# Patient Record
Sex: FEMALE | Race: WHITE | NOT HISPANIC OR LATINO
[De-identification: names, ages, dates, MRNs, and addresses within clinical notes are randomized per-mention and may not be internally consistent; named-entity substitution may affect disease eponyms.]

---

## 2017-01-20 LAB — INR PPP: 2.2

## 2017-02-17 LAB — INR PPP: 2.64

## 2017-03-13 ENCOUNTER — MEDICATION RENEWAL (OUTPATIENT)
Age: 82
End: 2017-03-13

## 2017-03-17 LAB — INR PPP: 2.36

## 2017-04-12 LAB — INR PPP: 2.64

## 2017-05-10 LAB — INR PPP: 2.53

## 2017-06-07 LAB — INR PPP: 2.44

## 2017-07-05 LAB — INR PPP: 2.77

## 2017-08-08 LAB — INR PPP: 3.03

## 2017-08-22 LAB — INR PPP: 2.03

## 2017-09-18 ENCOUNTER — NON-APPOINTMENT (OUTPATIENT)
Age: 82
End: 2017-09-18

## 2017-09-18 ENCOUNTER — APPOINTMENT (OUTPATIENT)
Dept: CARDIOLOGY | Facility: CLINIC | Age: 82
End: 2017-09-18
Payer: MEDICARE

## 2017-09-18 VITALS
DIASTOLIC BLOOD PRESSURE: 68 MMHG | OXYGEN SATURATION: 94 % | HEIGHT: 63 IN | SYSTOLIC BLOOD PRESSURE: 120 MMHG | HEART RATE: 77 BPM | BODY MASS INDEX: 25.87 KG/M2 | WEIGHT: 146 LBS

## 2017-09-18 PROCEDURE — 99215 OFFICE O/P EST HI 40 MIN: CPT

## 2017-09-18 PROCEDURE — 93000 ELECTROCARDIOGRAM COMPLETE: CPT

## 2017-09-19 LAB — INR PPP: 2.55

## 2017-10-17 LAB — INR PPP: 2.85

## 2017-11-14 LAB — INR PPP: 2.59

## 2018-01-09 LAB — INR PPP: 4.07

## 2018-01-15 LAB — INR PPP: 2.55

## 2018-02-06 LAB — INR PPP: 2.49

## 2018-03-07 LAB — INR PPP: 2.42

## 2018-03-08 ENCOUNTER — APPOINTMENT (OUTPATIENT)
Dept: UROLOGY | Facility: CLINIC | Age: 83
End: 2018-03-08
Payer: MEDICARE

## 2018-03-08 VITALS
OXYGEN SATURATION: 97 % | SYSTOLIC BLOOD PRESSURE: 112 MMHG | HEART RATE: 77 BPM | DIASTOLIC BLOOD PRESSURE: 72 MMHG | TEMPERATURE: 97.5 F

## 2018-03-08 DIAGNOSIS — R31.0 GROSS HEMATURIA: ICD-10-CM

## 2018-03-08 PROCEDURE — 99204 OFFICE O/P NEW MOD 45 MIN: CPT

## 2018-03-09 LAB
APPEARANCE: CLEAR
BACTERIA: NEGATIVE
BILIRUBIN URINE: NEGATIVE
BLOOD URINE: NEGATIVE
COLOR: ABNORMAL
GLUCOSE QUALITATIVE U: NEGATIVE MG/DL
HYALINE CASTS: 1 /LPF
KETONES URINE: ABNORMAL
LEUKOCYTE ESTERASE URINE: NEGATIVE
MICROSCOPIC-UA: NORMAL
NITRITE URINE: NEGATIVE
PH URINE: 5.5
PROTEIN URINE: NEGATIVE MG/DL
RED BLOOD CELLS URINE: 6 /HPF
SPECIFIC GRAVITY URINE: 1.02
SQUAMOUS EPITHELIAL CELLS: 2 /HPF
UROBILINOGEN URINE: NEGATIVE MG/DL
WHITE BLOOD CELLS URINE: 3 /HPF

## 2018-03-13 LAB
BACTERIA UR CULT: NORMAL
CORE LAB FLUID CYTOLOGY: NORMAL

## 2018-04-02 LAB — INR PPP: 2.14

## 2018-04-04 ENCOUNTER — NON-APPOINTMENT (OUTPATIENT)
Age: 83
End: 2018-04-04

## 2018-04-04 ENCOUNTER — APPOINTMENT (OUTPATIENT)
Dept: CARDIOLOGY | Facility: CLINIC | Age: 83
End: 2018-04-04
Payer: MEDICARE

## 2018-04-04 VITALS
HEIGHT: 63 IN | HEART RATE: 89 BPM | SYSTOLIC BLOOD PRESSURE: 129 MMHG | BODY MASS INDEX: 26.05 KG/M2 | OXYGEN SATURATION: 96 % | DIASTOLIC BLOOD PRESSURE: 84 MMHG | WEIGHT: 147 LBS

## 2018-04-04 LAB — INR PPP: 2.45

## 2018-04-04 PROCEDURE — 93000 ELECTROCARDIOGRAM COMPLETE: CPT

## 2018-04-04 PROCEDURE — 99215 OFFICE O/P EST HI 40 MIN: CPT

## 2018-04-30 LAB — INR PPP: 1.9

## 2018-05-16 LAB — INR PPP: 2.41

## 2018-05-22 ENCOUNTER — APPOINTMENT (OUTPATIENT)
Dept: CARDIOLOGY | Facility: CLINIC | Age: 83
End: 2018-05-22
Payer: MEDICARE

## 2018-05-22 PROCEDURE — 93306 TTE W/DOPPLER COMPLETE: CPT

## 2018-06-06 LAB — INR PPP: 2.08

## 2018-07-03 LAB — INR PPP: 2.15

## 2018-07-06 ENCOUNTER — APPOINTMENT (OUTPATIENT)
Dept: INTERNAL MEDICINE | Facility: CLINIC | Age: 83
End: 2018-07-06
Payer: MEDICARE

## 2018-07-06 VITALS
DIASTOLIC BLOOD PRESSURE: 70 MMHG | WEIGHT: 144 LBS | SYSTOLIC BLOOD PRESSURE: 110 MMHG | BODY MASS INDEX: 25.52 KG/M2 | HEIGHT: 63 IN | TEMPERATURE: 97.9 F

## 2018-07-06 DIAGNOSIS — Z13.89 ENCOUNTER FOR SCREENING FOR OTHER DISORDER: ICD-10-CM

## 2018-07-06 DIAGNOSIS — Z87.891 PERSONAL HISTORY OF NICOTINE DEPENDENCE: ICD-10-CM

## 2018-07-06 DIAGNOSIS — Z80.1 FAMILY HISTORY OF MALIGNANT NEOPLASM OF TRACHEA, BRONCHUS AND LUNG: ICD-10-CM

## 2018-07-06 PROCEDURE — G0444 DEPRESSION SCREEN ANNUAL: CPT | Mod: 59

## 2018-07-06 PROCEDURE — 99214 OFFICE O/P EST MOD 30 MIN: CPT | Mod: 25

## 2018-07-06 PROCEDURE — 36415 COLL VENOUS BLD VENIPUNCTURE: CPT

## 2018-07-06 NOTE — HISTORY OF PRESENT ILLNESS
[FreeTextEntry1] : f.u thyroid, anemia, hematuria [de-identified] : She has not seen any more blood in her urine.\par She is compliant with her Coumadin. Her INR has been therapeutic. It is monitored by her cardiologist.\par No chest pain, palpitations, shortness of breath or edema \par She line Dances regularly but notes that she can't walk as much as she used to\par

## 2018-07-06 NOTE — PLAN
[FreeTextEntry1] : \par Her blood pressure is stable\par Check labs today. We'll make adjustments to her thyroid medication based on lab results\par Continue to stay active\par Followup with urology if any blood in her urine\par Depression screening negative\par Followup 3 months\par \par

## 2018-07-06 NOTE — PHYSICAL EXAM
[No Acute Distress] : no acute distress [Well Nourished] : well nourished [Well Developed] : well developed [Well-Appearing] : well-appearing [Normal Sclera/Conjunctiva] : normal sclera/conjunctiva [Normal Outer Ear/Nose] : the outer ears and nose were normal in appearance [Normal Oropharynx] : the oropharynx was normal [Supple] : supple [No Lymphadenopathy] : no lymphadenopathy [No Respiratory Distress] : no respiratory distress  [Clear to Auscultation] : lungs were clear to auscultation bilaterally [No Accessory Muscle Use] : no accessory muscle use [Normal Rate] : normal rate  [Regular Rhythm] : with a regular rhythm [Normal S1, S2] : normal S1 and S2 [No Murmur] : no murmur heard [Pedal Pulses Present] : the pedal pulses are present [No Edema] : there was no peripheral edema [Normal Posterior Cervical Nodes] : no posterior cervical lymphadenopathy [Normal Anterior Cervical Nodes] : no anterior cervical lymphadenopathy [Normal Gait] : normal gait [Normal Affect] : the affect was normal [Normal Insight/Judgement] : insight and judgment were intact

## 2018-07-12 LAB
ALBUMIN SERPL ELPH-MCNC: 4.5 G/DL
ALP BLD-CCNC: 69 U/L
ALT SERPL-CCNC: 10 U/L
ANION GAP SERPL CALC-SCNC: 14 MMOL/L
AST SERPL-CCNC: 16 U/L
BASOPHILS # BLD AUTO: 0 K/UL
BASOPHILS NFR BLD AUTO: 0 %
BILIRUB SERPL-MCNC: 0.8 MG/DL
BUN SERPL-MCNC: 15 MG/DL
CALCIUM SERPL-MCNC: 9.3 MG/DL
CHLORIDE SERPL-SCNC: 105 MMOL/L
CHOLEST SERPL-MCNC: 164 MG/DL
CHOLEST/HDLC SERPL: 2.8 RATIO
CO2 SERPL-SCNC: 24 MMOL/L
CREAT SERPL-MCNC: 0.68 MG/DL
EOSINOPHIL # BLD AUTO: 0.01 K/UL
EOSINOPHIL NFR BLD AUTO: 0.2 %
GLUCOSE SERPL-MCNC: 110 MG/DL
HBA1C MFR BLD HPLC: 5.8 %
HCT VFR BLD CALC: 38.8 %
HDLC SERPL-MCNC: 58 MG/DL
HGB BLD-MCNC: 12.7 G/DL
IMM GRANULOCYTES NFR BLD AUTO: 0.2 %
LDLC SERPL CALC-MCNC: 92 MG/DL
LYMPHOCYTES # BLD AUTO: 0.9 K/UL
LYMPHOCYTES NFR BLD AUTO: 17.6 %
MAN DIFF?: NORMAL
MCHC RBC-ENTMCNC: 29.9 PG
MCHC RBC-ENTMCNC: 32.7 GM/DL
MCV RBC AUTO: 91.3 FL
MONOCYTES # BLD AUTO: 0.43 K/UL
MONOCYTES NFR BLD AUTO: 8.4 %
NEUTROPHILS # BLD AUTO: 3.75 K/UL
NEUTROPHILS NFR BLD AUTO: 73.6 %
PLATELET # BLD AUTO: 121 K/UL
POTASSIUM SERPL-SCNC: 4.2 MMOL/L
PROT SERPL-MCNC: 6.6 G/DL
RBC # BLD: 4.25 M/UL
RBC # FLD: 13.6 %
SODIUM SERPL-SCNC: 143 MMOL/L
TRIGL SERPL-MCNC: 69 MG/DL
TSH SERPL-ACNC: 1.69 UIU/ML
WBC # FLD AUTO: 5.1 K/UL

## 2018-07-31 LAB — INR PPP: 2.2

## 2018-08-28 LAB — INR PPP: 1.83

## 2018-09-07 ENCOUNTER — RX RENEWAL (OUTPATIENT)
Age: 83
End: 2018-09-07

## 2018-09-11 LAB — INR PPP: 3.2

## 2018-09-26 LAB — INR PPP: 3.42

## 2018-10-01 ENCOUNTER — RECORD ABSTRACTING (OUTPATIENT)
Age: 83
End: 2018-10-01

## 2018-10-02 ENCOUNTER — APPOINTMENT (OUTPATIENT)
Dept: INTERNAL MEDICINE | Facility: CLINIC | Age: 83
End: 2018-10-02
Payer: MEDICARE

## 2018-10-02 VITALS
WEIGHT: 145 LBS | TEMPERATURE: 98.1 F | DIASTOLIC BLOOD PRESSURE: 70 MMHG | BODY MASS INDEX: 25.69 KG/M2 | SYSTOLIC BLOOD PRESSURE: 120 MMHG | HEIGHT: 63 IN

## 2018-10-02 DIAGNOSIS — Z23 ENCOUNTER FOR IMMUNIZATION: ICD-10-CM

## 2018-10-02 DIAGNOSIS — Z87.19 PERSONAL HISTORY OF OTHER DISEASES OF THE DIGESTIVE SYSTEM: ICD-10-CM

## 2018-10-02 PROCEDURE — 36415 COLL VENOUS BLD VENIPUNCTURE: CPT

## 2018-10-02 PROCEDURE — 99214 OFFICE O/P EST MOD 30 MIN: CPT | Mod: 25

## 2018-10-02 PROCEDURE — G0008: CPT

## 2018-10-02 PROCEDURE — 90662 IIV NO PRSV INCREASED AG IM: CPT

## 2018-10-02 RX ORDER — PANTOPRAZOLE 40 MG/1
40 TABLET, DELAYED RELEASE ORAL DAILY
Qty: 90 | Refills: 0 | Status: DISCONTINUED | COMMUNITY
Start: 2018-09-07 | End: 2018-10-02

## 2018-10-02 NOTE — PLAN
[FreeTextEntry1] : Check labs today\par Medication list reviewed patient. She is unsure why she was on Protonix. Will hold for now. If she becomes symptomatic and start ranitidine.\par Flu shot today\par Followup 3 months

## 2018-10-02 NOTE — PHYSICAL EXAM
[No Acute Distress] : no acute distress [Normal Sclera/Conjunctiva] : normal sclera/conjunctiva [Normal Outer Ear/Nose] : the outer ears and nose were normal in appearance [No Respiratory Distress] : no respiratory distress  [Clear to Auscultation] : lungs were clear to auscultation bilaterally [No Accessory Muscle Use] : no accessory muscle use [Normal Rate] : normal rate  [Normal S1, S2] : normal S1 and S2 [No Edema] : there was no peripheral edema [Normal Affect] : the affect was normal [Normal Insight/Judgement] : insight and judgment were intact [de-identified] : irregularly irregular

## 2018-10-02 NOTE — HISTORY OF PRESENT ILLNESS
[FreeTextEntry1] : f/u BP, thyroid [de-identified] : She denies complaints. No chest pain, palpitations, shortness of breath. No edema.\par She is compliant with her medications.\par She stopped iron due to constipation

## 2018-10-08 LAB
ALBUMIN SERPL ELPH-MCNC: 4.4 G/DL
ALP BLD-CCNC: 73 U/L
ALT SERPL-CCNC: 18 U/L
ANION GAP SERPL CALC-SCNC: 12 MMOL/L
AST SERPL-CCNC: 23 U/L
BASOPHILS # BLD AUTO: 0 K/UL
BASOPHILS NFR BLD AUTO: 0 %
BILIRUB SERPL-MCNC: 0.7 MG/DL
BUN SERPL-MCNC: 15 MG/DL
CALCIUM SERPL-MCNC: 9.7 MG/DL
CHLORIDE SERPL-SCNC: 106 MMOL/L
CHOLEST SERPL-MCNC: 152 MG/DL
CHOLEST/HDLC SERPL: 3 RATIO
CO2 SERPL-SCNC: 25 MMOL/L
CREAT SERPL-MCNC: 0.61 MG/DL
DIGOXIN SERPL-MCNC: 0.9 NG/ML
EOSINOPHIL # BLD AUTO: 0 K/UL
EOSINOPHIL NFR BLD AUTO: 0 %
GLUCOSE SERPL-MCNC: 99 MG/DL
HBA1C MFR BLD HPLC: 6 %
HCT VFR BLD CALC: 38.9 %
HDLC SERPL-MCNC: 50 MG/DL
HGB BLD-MCNC: 12.3 G/DL
IMM GRANULOCYTES NFR BLD AUTO: 0.2 %
LDLC SERPL CALC-MCNC: 87 MG/DL
LYMPHOCYTES # BLD AUTO: 0.88 K/UL
LYMPHOCYTES NFR BLD AUTO: 15.4 %
MAN DIFF?: NORMAL
MCHC RBC-ENTMCNC: 29.2 PG
MCHC RBC-ENTMCNC: 31.6 GM/DL
MCV RBC AUTO: 92.4 FL
MONOCYTES # BLD AUTO: 0.42 K/UL
MONOCYTES NFR BLD AUTO: 7.4 %
NEUTROPHILS # BLD AUTO: 4.4 K/UL
NEUTROPHILS NFR BLD AUTO: 77 %
PLATELET # BLD AUTO: 122 K/UL
POTASSIUM SERPL-SCNC: 4.8 MMOL/L
PROT SERPL-MCNC: 6.8 G/DL
RBC # BLD: 4.21 M/UL
RBC # FLD: 14.4 %
SODIUM SERPL-SCNC: 143 MMOL/L
TRIGL SERPL-MCNC: 77 MG/DL
WBC # FLD AUTO: 5.71 K/UL

## 2018-10-10 ENCOUNTER — APPOINTMENT (OUTPATIENT)
Dept: INTERNAL MEDICINE | Facility: CLINIC | Age: 83
End: 2018-10-10
Payer: MEDICARE

## 2018-10-10 VITALS
TEMPERATURE: 97.1 F | HEIGHT: 63 IN | BODY MASS INDEX: 25.69 KG/M2 | DIASTOLIC BLOOD PRESSURE: 72 MMHG | WEIGHT: 145 LBS | SYSTOLIC BLOOD PRESSURE: 114 MMHG

## 2018-10-10 DIAGNOSIS — R58 HEMORRHAGE, NOT ELSEWHERE CLASSIFIED: ICD-10-CM

## 2018-10-10 DIAGNOSIS — R22.9 LOCALIZED SWELLING, MASS AND LUMP, UNSPECIFIED: ICD-10-CM

## 2018-10-10 PROCEDURE — 99213 OFFICE O/P EST LOW 20 MIN: CPT

## 2018-10-10 NOTE — HISTORY OF PRESENT ILLNESS
[FreeTextEntry8] : she c/o bruise on her left arm since her appointment on 10/2/18. She had a flu shot in that arm. the arm has been painful. She has been taking advil. at the site of the flu shot it is hard and tender. she denies any trauma.

## 2018-10-10 NOTE — PHYSICAL EXAM
[No Acute Distress] : no acute distress [de-identified] : left deltoid swelling and hardness and small ecchymosist. left arm large ecchymosis above elbow

## 2018-10-10 NOTE — PLAN
[FreeTextEntry1] : She does have swelling and a small bruise at the site of the injection. However the large bruise above her elbow is at the site of the blood pressure cuff. She states it's getting a little better. She is a icing her upper arm and taking Advil as needed. I advised her that we should check her INR. She had it done today according to her normal schedule. She will get a call from her cardiologist about it

## 2018-10-11 ENCOUNTER — RESULT REVIEW (OUTPATIENT)
Age: 83
End: 2018-10-11

## 2018-10-11 LAB — INR PPP: 2.62

## 2018-11-02 ENCOUNTER — RESULT REVIEW (OUTPATIENT)
Age: 83
End: 2018-11-02

## 2018-11-04 LAB — INR PPP: 2.64

## 2018-11-05 ENCOUNTER — NON-APPOINTMENT (OUTPATIENT)
Age: 83
End: 2018-11-05

## 2018-11-05 ENCOUNTER — APPOINTMENT (OUTPATIENT)
Dept: CARDIOLOGY | Facility: CLINIC | Age: 83
End: 2018-11-05
Payer: MEDICARE

## 2018-11-05 VITALS
HEART RATE: 77 BPM | SYSTOLIC BLOOD PRESSURE: 155 MMHG | BODY MASS INDEX: 25.69 KG/M2 | DIASTOLIC BLOOD PRESSURE: 73 MMHG | OXYGEN SATURATION: 98 % | WEIGHT: 145 LBS | HEIGHT: 63 IN

## 2018-11-05 VITALS — SYSTOLIC BLOOD PRESSURE: 128 MMHG | DIASTOLIC BLOOD PRESSURE: 76 MMHG

## 2018-11-05 DIAGNOSIS — Z01.810 ENCOUNTER FOR PREPROCEDURAL CARDIOVASCULAR EXAMINATION: ICD-10-CM

## 2018-11-05 PROCEDURE — 93000 ELECTROCARDIOGRAM COMPLETE: CPT

## 2018-11-05 PROCEDURE — 99215 OFFICE O/P EST HI 40 MIN: CPT

## 2018-11-05 NOTE — HISTORY OF PRESENT ILLNESS
[FreeTextEntry1] : Mrs. Crista Combs presented to the office today for follow-up cardiac evaluation.\par \par The patient is an 87-year-old female with a history of permanent atrial fibrillation, being managed with rate-control therapy and anticoagulation. She has a history of mild aortic insufficiency, mild-moderate mitral regurgitation, mild-moderate tricuspid regurgitation, mild coronary arteriosclerosis, mild carotid atherosclerosis, pre-diabetes, hypothyroidism, osteopenia, and glaucoma.\par \par The patient has been doing well from a cardiac symptomatic standpoint since her previous visit here on 4/4/18. Specifically, she has not noted recurrence of dyspnea on exertion, noting that she has resumed\par line dancing, without any associated cardiac symptoms. She has not experienced any symptoms of chest discomfort or palpitations. She has not noted orthopnea, paroxysmal nocturnal dyspnea, or lower extremity edema. She has not experienced any episodes of presyncope or syncope.\par \par Review of systems is significant for the patient having undergone left inguinal hernia repair surgery on 9/21/16, resection of a basal cell carcinoma from the right upper aspect of the back on 11/30/16, left cataract surgery on 4/9/18 and right cataract surgery on 4/26/18.\par \par An INR measurement performed on 10/10/18 (home drawing) revealed the level to be 2.62. The patient was instructed at that time to continue Coumadin at a dosage of 1 mg 1 day per week and 2 mg 6 days per week, and to have a follow-up INR measurement performed in 3 weeks for reassessment.\par \par Echocardiography most recently performed on 5/22/18 revealed mild left atrial enlargement with normal-sized ventricles. Left ventricular systolic function wall thickness and wall motion were normal, with an estimated ejection fraction of 60%. Mild mitral regurgitation and mild tricuspid regurgitation were demonstrated, with an estimated right ventricular systolic pressure 34 mm mercury.\par \par Laboratory studies performed on 10/2/18 revealed cholesterol 152, triglycerides 77, HDL 50, and calculated LDL 87. The liver chemistries were normal. The BUN and creatinine were 15 and 0.61, respectively. The potassium was 4.8. The glucose was 99 and the hemoglobin A1c level is 6.0%. The digoxin level was 0.9. The hemoglobin and hematocrit were 12.3 and 38.9, respectively. The platelet count was 122,000.\par \par Previous History:\par \par At the time of a previous visit here on 11/23/14, the patient informed me that over the preceding few months, she had noted an increased degree of dyspnea on exertion in association with her activities, which include walking and line dancing.\par \par Nuclear exercise testing performed on 12/7/15 for further evaluation of the patient's symptom of dyspnea on exertion  revealed the patient to exhibit diminished exercise capacity secondary to developing fatigue. No symptoms of chest discomfort or dyspnea developed during the study. There was an exaggerated heart rate response to exercise, noting the baseline rhythm of atrial fibrillation. Electrocardiographic evidence for myocardial ischemia was not demonstrated, noting the presence of baseline repolarization abnormalities, as well as the patient being treated with digoxin. Numerous episodes of wide-complex tachycardia were exhibited during exercise and the recovery period, possibly representing non-sustained ventricular tachycardia vs. aberrant conduction.The cardiac imaging portion of the study revealed normal left ventricular myocardial perfusion and systolic function, with a calculated ejection fraction of 64%\par \par Coronary angiography performed on 12/21/15, for further of the patient's symptom of dyspnea on exertion and the wide-complex tachycardia episodes exhibited during the nuclear stress study, revealed mild luminal irregularities in the coronary arteries, however, no obstructive coronary artery disease was demonstrated.\par \par Blood testing performed prior to the cardiac catheterization demonstrated the presence of an anemia, for which the patient was referred back to her internist for further evaluation. She had follow-up blood testing performed through the office of Dr. Perez on 1/26/16, which revealed the hemoglobin and hematocrit to be 9.7 and 34.4, respectively, with microcytic, hypochromic indices. The iron profile revealed the serum iron to be diminished at 21, the total iron binding capacity to be elevated at 440, the iron percent saturation to be diminished at 5%, and the ferritin level to be diminished at 9.0, all compatible with iron deficiency. The patient was started on ferrous sulfate 325 mg once daily. In addition, her Synthroid dosage was reduced from 112 mcg daily to 100 mg daily, noting that the thyroid-stimulating hormone level was 0.19. The glucose level was 104 and the hemoglobin A1c level was 6.8%. The digoxin level was 0.4. The BUN and creatinine were 19 and 0.75, respectively, with an estimated glomerular filtration rate of 73. The potassium level was 4.7.The liver chemistries were normal.\par \par The patient described having had a rectal examination performed by Dr. Perez, however, she is not certain if her stool specimen was positive for evidence of occult blood.\par \par The patient was referred by Dr. Perez to a gastroenterologist, who the patient stated discussed endoscopy and colonoscopy procedures, however, did not definitively recommended that the patient have these procedures performed.\par \par Follow-up laboratory testing performed by Dr. Perez on 4/28/16 revealed hemoglobin and hematocrit to be 13.0 and 43.6, respectively. The platelet count was mildly diminished at 114,000.\par \par The patient has a prior history of 2 documented episodes of paroxysmal atrial fibrillation, with the initial episode having occurred on 12/9/03, and a subsequent episode having developed on 10/1/05. Both of these episodes reverted back to sinus rhythm spontaneously. The patient had been maintained on beta-blocker therapy over the years, however, she had previously declined anticoagulation, and had been taking aspirin as an alternative. In February of 2014, the patient was discovered as exhibiting atrial fibrillation with a tachycardic ventricular response, when she presented to the emergency room at [facility] on 2/6/14 with symptoms suggestive of gastroenteritis, and possibly diverticulitis. Her dosage of Toprol-XL was increased, and digoxin was added for additional rate-control therapy. In addition, anticoagulation with Coumadin was initiated.\par \par Holter monitoring performed from 2/25/14 through 2/26/14 revealed atrial fibrillation throughout the recording, with an average ventricular rate of 72 beats per minute area there were no sustained extremes of bradycardic or tachycardic ventricular response. Rare to occasional pauses were noted, the longest of which was measured to be 3.3 seconds in duration. Rare ventricular premature contractions were noted, including 2 couplets, however, no episodes of ventricular tachycardia occurred. No symptoms were reported during the recording.\par \par Carotid artery Doppler testing performed on 5/14/15 revealed mild plaque formation involving the proximal portions of the internal carotid arteries bilaterally, however, no significant stenoses were demonstrated.\par \par As previously stated, in February of 2014, the patient developed gastroenteritis, manifesting as diarrhea, weakness, decreased appetite, lightheadedness, and shortness of breath.  After a few days of experiencing these symptoms, she presented to the emergency room at [facility] on 2/6/14, and was discovered as exhibiting atrial fibrillation with a tachycardic ventricular response. Regarding the atrial fibrillation, she was treated with intensified rate-control therapy (her dosage of Toprol-XL was increased and she was started on digoxin), and anticoagulation was initiated to reduce the risk of stroke associated with atrial fibrillation.  Cardioversion was not attempted, noting that the duration of the atrial fibrillation was uncertain, and the patient was not anticoagulated prior to presenting to the hospital. She was managed with intravenous hydration and antibiotic therapy for her gastroenteritis (she recently completed a course of Cipro and Flagyl). She reports having had a CT scan of the abdomen performed, however, neither she nor her daughter (who is a nurse in [place], and had accompanied the patient to her previous visit here on 2/19/14) were uncertain as to whether or not this study revealed evidence for diverticulitis.\par \par As far as risk factors for coronary artery disease are concerned, the patient denies having a history of hypertension, diabetes, a dyslipidemia, or cigarette smoking. She does not have a known immediate family history of premature coronary artery disease.\par \par Past medical history is otherwise significant for possible pericarditis more than 30 years ago, glaucoma, hypothyroidism, osteopenia (or osteoporosis), right inguinal hernia repair (with mesh) on 11/3/14, and left inguinal hernia repair on 9/21/16.\par

## 2018-11-05 NOTE — PHYSICAL EXAM
[General Appearance - Well Developed] : well developed [General Appearance - In No Acute Distress] : no acute distress [Normal Conjunctiva] : the conjunctiva exhibited no abnormalities [No Oral Pallor] : no oral pallor [Respiration, Rhythm And Depth] : normal respiratory rhythm and effort [Auscultation Breath Sounds / Voice Sounds] : lungs were clear to auscultation bilaterally [Bowel Sounds] : normal bowel sounds [Abdomen Tenderness] : non-tender [Abnormal Walk] : normal gait [Cyanosis, Localized] : no localized cyanosis [] : no rash [Oriented To Time, Place, And Person] : oriented to person, place, and time [Not Palpable] : not palpable [No Precordial Heave] : no precordial heave was noted [Normal Rate] : normal [Irregularly Irregular] : irregularly irregular [Normal S1] : normal S1 [Normal S2] : normal S2 [No Gallop] : no gallop heard [No Murmur] : no murmurs heard [2+] : left 2+ [No Abnormalities] : the abdominal aorta was not enlarged and no bruit was heard [No Pitting Edema] : no pitting edema present [FreeTextEntry1] : no significant JVD is appreciated at a 45° angle [Apical Thrill] : no thrill palpable at the apex [Click] : no click [Pericardial Rub] : no pericardial rub [Right Carotid Bruit] : no bruit heard over the right carotid [Left Carotid Bruit] : no bruit heard over the left carotid

## 2018-11-05 NOTE — DISCUSSION/SUMMARY
[FreeTextEntry1] : Mrs. Combs has been doing well from a cardiac symptomatic standpoint since her previous visit here on 4/4/18. Specifically, she does not describe having experienced any signs or symptoms to suggest the development of an anginal syndrome, congestive heart failure, or a hemodynamically-compromising arrhythmia. She is exhibiting persistent asymptomatic atrial fibrillation on her electrocardiogram today, with non-specific poor R-wave progression and non-specific repolarization abnormalities, essentially unchanged from her previous office tracing. Her cardiac examination today is unchanged from her previous visit with me, and she is specifically not exhibiting any evidence of congestive heart failure on examination today. Her blood pressure reading was moderately elevated upon initial presentation to the office today, however, a follow-up measurement obtained after her examination revealed the bleeding to be normal.\par \par The patient's iron deficiency anemia corrected with iron supplementation, as advanced on the most recent blood test report of 10/2/18. Her platelet count was again mildly diminished, however, and is being followed by her internist, according to the patient.\par \par An INR measurement performed on 10/10/18 revealed the reading to be therapeutic at 2.14. The patient was instructed at that time to take Coumadin at a dosage 2 mg 6 days per week and 1 mg 1 day per week. The patient will be having a follow-up INR level obtained via a home drawing in the near future for reassessment.\par \par I have instructed the patient to continue on her present cardiac medications as prescribed for the time being. I have electronically prescribed a renewal for Coumadin to the patient's pharmacy for her today.\par \par The patient anticipates having follow-up blood testing performed through the office of her internist in the near future, and she will have a copy of the results forwarded to my office for my review and records.\par \par I have reviewed the findings of the echocardiogram of 5/22/18 in detail with the patient today.\par \par I have asked the patient to call me if she should have any questions or problems pertaining to these matters, and especially if she should experience any concerning symptoms. I have otherwise asked her to return to the office for follow-up cardiac evaluation in 6 months, provided she remains clinically stable in the interim.

## 2018-11-28 LAB
INR PPP: 2.55
PT BLD: 28.6

## 2018-12-19 ENCOUNTER — RESULT REVIEW (OUTPATIENT)
Age: 83
End: 2018-12-19

## 2018-12-19 LAB — INR PPP: 2.18

## 2019-01-08 ENCOUNTER — LABORATORY RESULT (OUTPATIENT)
Age: 84
End: 2019-01-08

## 2019-01-08 ENCOUNTER — RX RENEWAL (OUTPATIENT)
Age: 84
End: 2019-01-08

## 2019-01-08 ENCOUNTER — APPOINTMENT (OUTPATIENT)
Dept: INTERNAL MEDICINE | Facility: CLINIC | Age: 84
End: 2019-01-08
Payer: MEDICARE

## 2019-01-08 VITALS
TEMPERATURE: 97 F | SYSTOLIC BLOOD PRESSURE: 110 MMHG | WEIGHT: 146 LBS | HEIGHT: 63 IN | BODY MASS INDEX: 25.87 KG/M2 | DIASTOLIC BLOOD PRESSURE: 80 MMHG

## 2019-01-08 PROCEDURE — 36415 COLL VENOUS BLD VENIPUNCTURE: CPT

## 2019-01-08 PROCEDURE — 99214 OFFICE O/P EST MOD 30 MIN: CPT | Mod: 25

## 2019-01-08 NOTE — HEALTH RISK ASSESSMENT
[] : No [(PHQ-2) Unable to screen] : PHQ-2: unable to screen [UnableToScreenReason] : patient refused

## 2019-01-08 NOTE — PHYSICAL EXAM
[No Acute Distress] : no acute distress [Well Nourished] : well nourished [Normal Sclera/Conjunctiva] : normal sclera/conjunctiva [PERRL] : pupils equal round and reactive to light [Normal Outer Ear/Nose] : the outer ears and nose were normal in appearance [Normal Oropharynx] : the oropharynx was normal [Supple] : supple [No Respiratory Distress] : no respiratory distress  [Clear to Auscultation] : lungs were clear to auscultation bilaterally [No Accessory Muscle Use] : no accessory muscle use [Normal Rate] : normal rate  [Regular Rhythm] : with a regular rhythm [Normal S1, S2] : normal S1 and S2 [Normal Gait] : normal gait [No Focal Deficits] : no focal deficits [Normal Affect] : the affect was normal [Normal Insight/Judgement] : insight and judgment were intact

## 2019-01-08 NOTE — PLAN
[FreeTextEntry1] : refused depression screening\par BP controlled\par check labs\par refill synthoid as labs dictate\par f/u 3 months

## 2019-01-15 ENCOUNTER — RESULT REVIEW (OUTPATIENT)
Age: 84
End: 2019-01-15

## 2019-01-15 LAB
ALBUMIN SERPL ELPH-MCNC: 4.4 G/DL
ALP BLD-CCNC: 69 U/L
ALT SERPL-CCNC: 11 U/L
ANION GAP SERPL CALC-SCNC: 11 MMOL/L
AST SERPL-CCNC: 20 U/L
BILIRUB SERPL-MCNC: 0.6 MG/DL
BUN SERPL-MCNC: 17 MG/DL
CALCIUM SERPL-MCNC: 9.6 MG/DL
CHLORIDE SERPL-SCNC: 105 MMOL/L
CHOLEST SERPL-MCNC: 165 MG/DL
CHOLEST/HDLC SERPL: 3.2 RATIO
CO2 SERPL-SCNC: 26 MMOL/L
CREAT SERPL-MCNC: 0.69 MG/DL
DIGOXIN SERPL-MCNC: 0.6 NG/ML
GLUCOSE SERPL-MCNC: 92 MG/DL
HBA1C MFR BLD HPLC: 5.9 %
HDLC SERPL-MCNC: 51 MG/DL
LDLC SERPL CALC-MCNC: 96 MG/DL
POTASSIUM SERPL-SCNC: 5 MMOL/L
PROT SERPL-MCNC: 6.8 G/DL
SODIUM SERPL-SCNC: 142 MMOL/L
TRIGL SERPL-MCNC: 92 MG/DL

## 2019-01-16 LAB — INR PPP: 2.19

## 2019-02-12 LAB — INR PPP: 1.93

## 2019-03-13 LAB — INR PPP: 1.9

## 2019-03-26 ENCOUNTER — OTHER (OUTPATIENT)
Age: 84
End: 2019-03-26

## 2019-03-26 LAB — INR PPP: 2.1

## 2019-04-11 LAB — INR PPP: 2.21

## 2019-05-01 ENCOUNTER — APPOINTMENT (OUTPATIENT)
Dept: INTERNAL MEDICINE | Facility: CLINIC | Age: 84
End: 2019-05-01
Payer: MEDICARE

## 2019-05-01 ENCOUNTER — OTHER (OUTPATIENT)
Age: 84
End: 2019-05-01

## 2019-05-01 VITALS
HEIGHT: 63 IN | DIASTOLIC BLOOD PRESSURE: 80 MMHG | BODY MASS INDEX: 26.22 KG/M2 | TEMPERATURE: 97.2 F | SYSTOLIC BLOOD PRESSURE: 110 MMHG | WEIGHT: 148 LBS

## 2019-05-01 LAB — INR PPP: 3.22

## 2019-05-01 PROCEDURE — 99214 OFFICE O/P EST MOD 30 MIN: CPT | Mod: 25

## 2019-05-01 PROCEDURE — 36415 COLL VENOUS BLD VENIPUNCTURE: CPT

## 2019-05-01 NOTE — HISTORY OF PRESENT ILLNESS
[FreeTextEntry1] : f/u thyroid [de-identified] : doing well. no complaints\par had her INR checked yesterday. it was fine. no bleeding\par no chest pain, palpitations, SOB

## 2019-05-01 NOTE — PHYSICAL EXAM
[Well Nourished] : well nourished [Normal Sclera/Conjunctiva] : normal sclera/conjunctiva [No Acute Distress] : no acute distress [Supple] : supple [Normal Outer Ear/Nose] : the outer ears and nose were normal in appearance [Clear to Auscultation] : lungs were clear to auscultation bilaterally [No Respiratory Distress] : no respiratory distress  [No Accessory Muscle Use] : no accessory muscle use [Normal S1, S2] : normal S1 and S2 [Normal Rate] : normal rate  [Regular Rhythm] : with a regular rhythm [Normal Gait] : normal gait [No Focal Deficits] : no focal deficits [Normal Insight/Judgement] : insight and judgment were intact [Normal Affect] : the affect was normal

## 2019-05-01 NOTE — PLAN
[FreeTextEntry1] : declined depression screening\par BP controlled\par check labs\par refill synthroid as labs dictate\par Immunizations UTD\par f/u 4-5 months

## 2019-05-02 ENCOUNTER — RX RENEWAL (OUTPATIENT)
Age: 84
End: 2019-05-02

## 2019-05-07 LAB
ALBUMIN SERPL ELPH-MCNC: 4.4 G/DL
ALP BLD-CCNC: 70 U/L
ALT SERPL-CCNC: 14 U/L
ANION GAP SERPL CALC-SCNC: 11 MMOL/L
AST SERPL-CCNC: 18 U/L
BILIRUB SERPL-MCNC: 0.6 MG/DL
BUN SERPL-MCNC: 17 MG/DL
CALCIUM SERPL-MCNC: 9.4 MG/DL
CHLORIDE SERPL-SCNC: 107 MMOL/L
CO2 SERPL-SCNC: 25 MMOL/L
CREAT SERPL-MCNC: 0.61 MG/DL
ESTIMATED AVERAGE GLUCOSE: 126 MG/DL
GLUCOSE SERPL-MCNC: 122 MG/DL
HBA1C MFR BLD HPLC: 6 %
POTASSIUM SERPL-SCNC: 4.4 MMOL/L
PROT SERPL-MCNC: 6.3 G/DL
SODIUM SERPL-SCNC: 143 MMOL/L
TSH SERPL-ACNC: 1.97 UIU/ML

## 2019-05-15 LAB — INR PPP: 3.24

## 2019-05-29 LAB — INR PPP: 2.4

## 2019-06-14 ENCOUNTER — RESULT REVIEW (OUTPATIENT)
Age: 84
End: 2019-06-14

## 2019-06-18 ENCOUNTER — MEDICATION RENEWAL (OUTPATIENT)
Age: 84
End: 2019-06-18

## 2019-06-19 ENCOUNTER — OTHER (OUTPATIENT)
Age: 84
End: 2019-06-19

## 2019-06-19 LAB — INR PPP: 3.49

## 2019-06-25 ENCOUNTER — MESSAGE (OUTPATIENT)
Age: 84
End: 2019-06-25

## 2019-06-26 ENCOUNTER — MESSAGE (OUTPATIENT)
Age: 84
End: 2019-06-26

## 2019-07-02 ENCOUNTER — OTHER (OUTPATIENT)
Age: 84
End: 2019-07-02

## 2019-07-02 LAB — INR PPP: 2.3

## 2019-07-24 LAB — INR PPP: 2.3

## 2019-08-06 ENCOUNTER — APPOINTMENT (OUTPATIENT)
Dept: CARDIOLOGY | Facility: CLINIC | Age: 84
End: 2019-08-06
Payer: MEDICARE

## 2019-08-06 ENCOUNTER — NON-APPOINTMENT (OUTPATIENT)
Age: 84
End: 2019-08-06

## 2019-08-06 VITALS
DIASTOLIC BLOOD PRESSURE: 62 MMHG | OXYGEN SATURATION: 98 % | HEART RATE: 65 BPM | HEIGHT: 63 IN | BODY MASS INDEX: 26.05 KG/M2 | WEIGHT: 147 LBS | SYSTOLIC BLOOD PRESSURE: 105 MMHG

## 2019-08-06 DIAGNOSIS — R06.09 OTHER FORMS OF DYSPNEA: ICD-10-CM

## 2019-08-06 PROCEDURE — 99215 OFFICE O/P EST HI 40 MIN: CPT

## 2019-08-06 PROCEDURE — 93000 ELECTROCARDIOGRAM COMPLETE: CPT

## 2019-08-06 NOTE — PHYSICAL EXAM
[General Appearance - In No Acute Distress] : no acute distress [General Appearance - Well Developed] : well developed [Normal Conjunctiva] : the conjunctiva exhibited no abnormalities [No Oral Pallor] : no oral pallor [Respiration, Rhythm And Depth] : normal respiratory rhythm and effort [Auscultation Breath Sounds / Voice Sounds] : lungs were clear to auscultation bilaterally [Bowel Sounds] : normal bowel sounds [Abdomen Tenderness] : non-tender [Abnormal Walk] : normal gait [Cyanosis, Localized] : no localized cyanosis [] : no rash [Oriented To Time, Place, And Person] : oriented to person, place, and time [No Precordial Heave] : no precordial heave was noted [Not Palpable] : not palpable [Irregularly Irregular] : irregularly irregular [Normal Rate] : normal [Normal S1] : normal S1 [Normal S2] : normal S2 [No Gallop] : no gallop heard [No Murmur] : no murmurs heard [2+] : left 2+ [No Abnormalities] : the abdominal aorta was not enlarged and no bruit was heard [No Pitting Edema] : no pitting edema present [FreeTextEntry1] : no significant JVD is appreciated at a 45° angle [Click] : no click [Apical Thrill] : no thrill palpable at the apex [Pericardial Rub] : no pericardial rub [Right Carotid Bruit] : no bruit heard over the right carotid [Left Carotid Bruit] : no bruit heard over the left carotid

## 2019-08-06 NOTE — DISCUSSION/SUMMARY
Patient came into [facility] office today requesting for lab result, validated proper identification and password along with date of birth, received copy of negative gc/chlamydia/ trich/yeast/ bv. DOREEN Murillo RN     Signatures   Electronically signed by : Koffi Murillo R.N.; Sep  6 2018 10:01AM CST    
[FreeTextEntry1] : Mrs. Combs has been doing well from a cardiac symptomatic standpoint since her previous visit here on 11/5/18. Specifically, she does not describe having experienced any signs or symptoms to suggest the development of an anginal syndrome, congestive heart failure, or a hemodynamically-compromising arrhythmia. She is exhibiting persistent asymptomatic atrial fibrillation on her electrocardiogram today, with non-specific poor R-wave progression and non-specific repolarization abnormalities, essentially unchanged from her previous office tracing. Her cardiac examination today is unchanged from her previous visit with me, and she is specifically not exhibiting any evidence of congestive heart failure on examination today. Her blood pressure reading today is normal.\par \par The patient's iron deficiency anemia corrected with iron supplementation, as evidenced on the blood test report of 10/2/18. Her platelet count was again mildly diminished, however, and is being followed by her internist, according to the patient.\par \par An INR measurement performed on 7/24/19 revealed the reading to be therapeutic at 2.3. The patient was instructed at that time to take Coumadin at a dosage 2 mg 6 days per week and 1 mg 1 day per week. The patient was instructed at that time to have a follow-up INR measurement performed in 4 weeks for reassessment.\par \par I have instructed the patient to continue her present cardiac medications as presently prescribed for the time being.\par \par The patient anticipates having follow-up blood testing performed through the office of her internist in the near future, and she will have a copy of the results forwarded to my office for my review and records.\par \par I am referring the patient for follow-up echocardiography at this point to reassess cardiac structural integrity, left ventricular function, and valvular morphology and function. The patient is going to make arrangements to have this study performed through our office, and I will telephone her to discuss the findings, once the study has been completed.\par \par I have asked the patient to call me if she should have any questions or problems pertaining to these matters, and especially if she should experience any concerning symptoms. I have otherwise asked her to return to the office for follow-up cardiac evaluation in 6 months, provided she remains clinically stable in the interim.

## 2019-08-06 NOTE — HISTORY OF PRESENT ILLNESS
[FreeTextEntry1] : Mrs. Crista Combs presented to the office today for follow-up cardiac evaluation. I last evaluated the patient in the office on 11/5/18.\par \par The patient is an 88-year-old female with a history of permanent atrial fibrillation, being managed with rate-control therapy and anticoagulation. She has a history of mild aortic insufficiency, mild-moderate mitral regurgitation, mild-moderate tricuspid regurgitation, mild coronary arteriosclerosis, mild carotid atherosclerosis, pre-diabetes, hypothyroidism, osteopenia, and glaucoma.\par \par The patient has been doing well from a cardiac symptomatic standpoint since her previous visit here on 11/5/18. Specifically, she has not noted recurrence of dyspnea on exertion, noting that she has resumed\par line dancing, without any associated cardiac symptoms. She has not experienced any symptoms of chest discomfort or palpitations. She has not noted orthopnea, paroxysmal nocturnal dyspnea, or lower extremity edema. She has not experienced any episodes of presyncope or syncope.\par \par Review of systems is significant for the patient having undergone left inguinal hernia repair surgery on 9/21/16, resection of a basal cell carcinoma from the right upper aspect of the back on 11/30/16, left cataract surgery on 4/9/18 and right cataract surgery on 4/26/18. She has been discovered as having pancreatic cyst, which are under surveillance.\par \par An INR measurement performed on 7/24/19 (home drawing) revealed the level to be 2.3. The patient was instructed at that time to continue Coumadin at a dosage of 1 mg 1 day per week and 2 mg 6 days per week, and to have a follow-up INR measurement performed in 4 weeks for reassessment.\par \par Echocardiography most recently performed on 5/22/18 revealed mild left atrial enlargement with normal-sized ventricles. Left ventricular systolic function wall thickness and wall motion were normal, with an estimated ejection fraction of 60%. Mild mitral regurgitation and mild tricuspid regurgitation were demonstrated, with an estimated right ventricular systolic pressure 34 mm mercury.\par \par A fasting lipid profile performed on 10/2/18 revealed cholesterol 152, triglycerides 77, HDL 50, and calculated LDL 87. Laboratory studies more recently performed on 5/1/19 revealed the liver chemistries to be normal. The BUN and creatinine were 17 and 0.61, respectively. The potassium level is 4.4. The TSH level was 1.97. The glucose level is 122 and the hemoglobin A1c level is 6.0%.\par \par Previous History:\par \par At the time of a previous visit here on 11/23/14, the patient informed me that over the preceding few months, she had noted an increased degree of dyspnea on exertion in association with her activities, which include walking and line dancing.\par \par Nuclear exercise testing performed on 12/7/15 for further evaluation of the patient's symptom of dyspnea on exertion  revealed the patient to exhibit diminished exercise capacity secondary to developing fatigue. No symptoms of chest discomfort or dyspnea developed during the study. There was an exaggerated heart rate response to exercise, noting the baseline rhythm of atrial fibrillation. Electrocardiographic evidence for myocardial ischemia was not demonstrated, noting the presence of baseline repolarization abnormalities, as well as the patient being treated with digoxin. Numerous episodes of wide-complex tachycardia were exhibited during exercise and the recovery period, possibly representing non-sustained ventricular tachycardia vs. aberrant conduction.The cardiac imaging portion of the study revealed normal left ventricular myocardial perfusion and systolic function, with a calculated ejection fraction of 64%\par \par Coronary angiography performed on 12/21/15, for further of the patient's symptom of dyspnea on exertion and the wide-complex tachycardia episodes exhibited during the nuclear stress study, revealed mild luminal irregularities in the coronary arteries, however, no obstructive coronary artery disease was demonstrated.\par \par Blood testing performed prior to the cardiac catheterization demonstrated the presence of an anemia, for which the patient was referred back to her internist for further evaluation. She had follow-up blood testing performed through the office of Dr. Perez on 1/26/16, which revealed the hemoglobin and hematocrit to be 9.7 and 34.4, respectively, with microcytic, hypochromic indices. The iron profile revealed the serum iron to be diminished at 21, the total iron binding capacity to be elevated at 440, the iron percent saturation to be diminished at 5%, and the ferritin level to be diminished at 9.0, all compatible with iron deficiency. The patient was started on ferrous sulfate 325 mg once daily. In addition, her Synthroid dosage was reduced from 112 mcg daily to 100 mg daily, noting that the thyroid-stimulating hormone level was 0.19. The glucose level was 104 and the hemoglobin A1c level was 6.8%. The digoxin level was 0.4. The BUN and creatinine were 19 and 0.75, respectively, with an estimated glomerular filtration rate of 73. The potassium level was 4.7.The liver chemistries were normal.\par \par The patient described having had a rectal examination performed by Dr. Perez, however, she is not certain if her stool specimen was positive for evidence of occult blood.\par \par The patient was referred by Dr. Perez to a gastroenterologist, who the patient stated discussed endoscopy and colonoscopy procedures, however, did not definitively recommended that the patient have these procedures performed.\par \par Follow-up laboratory testing performed by Dr. Perez on 4/28/16 revealed hemoglobin and hematocrit to be 13.0 and 43.6, respectively. The platelet count was mildly diminished at 114,000.\par \par The patient has a prior history of 2 documented episodes of paroxysmal atrial fibrillation, with the initial episode having occurred on 12/9/03, and a subsequent episode having developed on 10/1/05. Both of these episodes reverted back to sinus rhythm spontaneously. The patient had been maintained on beta-blocker therapy over the years, however, she had previously declined anticoagulation, and had been taking aspirin as an alternative. In February of 2014, the patient was discovered as exhibiting atrial fibrillation with a tachycardic ventricular response, when she presented to the emergency room at [facility] on 2/6/14 with symptoms suggestive of gastroenteritis, and possibly diverticulitis. Her dosage of Toprol-XL was increased, and digoxin was added for additional rate-control therapy. In addition, anticoagulation with Coumadin was initiated.\par \par Holter monitoring performed from 2/25/14 through 2/26/14 revealed atrial fibrillation throughout the recording, with an average ventricular rate of 72 beats per minute area there were no sustained extremes of bradycardic or tachycardic ventricular response. Rare to occasional pauses were noted, the longest of which was measured to be 3.3 seconds in duration. Rare ventricular premature contractions were noted, including 2 couplets, however, no episodes of ventricular tachycardia occurred. No symptoms were reported during the recording.\par \par Carotid artery Doppler testing performed on 5/14/15 revealed mild plaque formation involving the proximal portions of the internal carotid arteries bilaterally, however, no significant stenoses were demonstrated.\par \par As previously stated, in February of 2014, the patient developed gastroenteritis, manifesting as diarrhea, weakness, decreased appetite, lightheadedness, and shortness of breath.  After a few days of experiencing these symptoms, she presented to the emergency room at [facility] on 2/6/14, and was discovered as exhibiting atrial fibrillation with a tachycardic ventricular response. Regarding the atrial fibrillation, she was treated with intensified rate-control therapy (her dosage of Toprol-XL was increased and she was started on digoxin), and anticoagulation was initiated to reduce the risk of stroke associated with atrial fibrillation.  Cardioversion was not attempted, noting that the duration of the atrial fibrillation was uncertain, and the patient was not anticoagulated prior to presenting to the hospital. She was managed with intravenous hydration and antibiotic therapy for her gastroenteritis (she recently completed a course of Cipro and Flagyl). She reports having had a CT scan of the abdomen performed, however, neither she nor her daughter (who is a nurse in [place], and had accompanied the patient to her previous visit here on 2/19/14) were uncertain as to whether or not this study revealed evidence for diverticulitis.\par \par As far as risk factors for coronary artery disease are concerned, the patient denies having a history of hypertension, diabetes, a dyslipidemia, or cigarette smoking. She does not have a known immediate family history of premature coronary artery disease.\par \par Past medical history is otherwise significant for possible pericarditis more than 30 years ago, glaucoma, hypothyroidism, osteopenia (or osteoporosis), right inguinal hernia repair (with mesh) on 11/3/14, and left inguinal hernia repair on 9/21/16.\par

## 2019-08-20 LAB — INR PPP: 2.82

## 2019-08-21 ENCOUNTER — APPOINTMENT (OUTPATIENT)
Dept: CARDIOLOGY | Facility: CLINIC | Age: 84
End: 2019-08-21
Payer: MEDICARE

## 2019-08-21 PROCEDURE — 93306 TTE W/DOPPLER COMPLETE: CPT

## 2019-09-18 ENCOUNTER — OTHER (OUTPATIENT)
Age: 84
End: 2019-09-18

## 2019-09-18 LAB — INR PPP: 2.75

## 2019-10-04 ENCOUNTER — APPOINTMENT (OUTPATIENT)
Dept: INTERNAL MEDICINE | Facility: CLINIC | Age: 84
End: 2019-10-04
Payer: MEDICARE

## 2019-10-04 VITALS
BODY MASS INDEX: 25.69 KG/M2 | DIASTOLIC BLOOD PRESSURE: 80 MMHG | TEMPERATURE: 97.1 F | HEART RATE: 66 BPM | WEIGHT: 145 LBS | OXYGEN SATURATION: 98 % | SYSTOLIC BLOOD PRESSURE: 120 MMHG | HEIGHT: 63 IN

## 2019-10-04 DIAGNOSIS — T80.89XA OTHER COMPLICATIONS FOLLOWING INFUSION, TRANSFUSION AND THERAPEUTIC INJECTION, INITIAL ENCOUNTER: ICD-10-CM

## 2019-10-04 PROCEDURE — 36415 COLL VENOUS BLD VENIPUNCTURE: CPT

## 2019-10-04 PROCEDURE — 99214 OFFICE O/P EST MOD 30 MIN: CPT | Mod: 25

## 2019-10-04 NOTE — PLAN
[FreeTextEntry1] : Her blood pressure is controlled\par Check labs today, will include a lipid panel for her cardiologist.\par Given a prescription for a mammogram\par She is to get the flu shot at her [facility]\par Followup 3 months

## 2019-10-04 NOTE — HISTORY OF PRESENT ILLNESS
[FreeTextEntry1] : f/u thyroid [de-identified] : She had a MRI done which showed a pancreatic cyst. She is to repeat imaging in 2 years.\par She saw her cardiologist and everything was stable.\par She takes her thyroid medication. No fatigue, change in bowel movements, change in hair or skin\par She is taking exercise classes at [facility]

## 2019-10-08 ENCOUNTER — RESULT REVIEW (OUTPATIENT)
Age: 84
End: 2019-10-08

## 2019-10-08 LAB
ALBUMIN SERPL ELPH-MCNC: 4.4 G/DL
ALP BLD-CCNC: 71 U/L
ALT SERPL-CCNC: 12 U/L
ANION GAP SERPL CALC-SCNC: 10 MMOL/L
AST SERPL-CCNC: 16 U/L
BASOPHILS # BLD AUTO: 0 K/UL
BASOPHILS NFR BLD AUTO: 0 %
BILIRUB SERPL-MCNC: 0.6 MG/DL
BUN SERPL-MCNC: 17 MG/DL
CALCIUM SERPL-MCNC: 9.5 MG/DL
CHLORIDE SERPL-SCNC: 106 MMOL/L
CHOLEST SERPL-MCNC: 145 MG/DL
CHOLEST/HDLC SERPL: 2.7 RATIO
CO2 SERPL-SCNC: 25 MMOL/L
CREAT SERPL-MCNC: 0.65 MG/DL
DIGOXIN SERPL-MCNC: 0.6 NG/ML
EOSINOPHIL # BLD AUTO: 0.11 K/UL
EOSINOPHIL NFR BLD AUTO: 2 %
ESTIMATED AVERAGE GLUCOSE: 126 MG/DL
GLUCOSE SERPL-MCNC: 118 MG/DL
HBA1C MFR BLD HPLC: 6 %
HCT VFR BLD CALC: 39 %
HDLC SERPL-MCNC: 53 MG/DL
HGB BLD-MCNC: 11.7 G/DL
IMM GRANULOCYTES NFR BLD AUTO: 0.2 %
LDLC SERPL CALC-MCNC: 78 MG/DL
LYMPHOCYTES # BLD AUTO: 0.95 K/UL
LYMPHOCYTES NFR BLD AUTO: 17.4 %
MAN DIFF?: NORMAL
MCHC RBC-ENTMCNC: 27.2 PG
MCHC RBC-ENTMCNC: 30 GM/DL
MCV RBC AUTO: 90.7 FL
MONOCYTES # BLD AUTO: 0.47 K/UL
MONOCYTES NFR BLD AUTO: 8.6 %
NEUTROPHILS # BLD AUTO: 3.91 K/UL
NEUTROPHILS NFR BLD AUTO: 71.8 %
PLATELET # BLD AUTO: 107 K/UL
POTASSIUM SERPL-SCNC: 4.8 MMOL/L
PROT SERPL-MCNC: 6.2 G/DL
RBC # BLD: 4.3 M/UL
RBC # FLD: 15.4 %
SODIUM SERPL-SCNC: 141 MMOL/L
TRIGL SERPL-MCNC: 71 MG/DL
TSH SERPL-ACNC: 1.73 UIU/ML
WBC # FLD AUTO: 5.45 K/UL

## 2019-10-09 ENCOUNTER — RESULT REVIEW (OUTPATIENT)
Age: 84
End: 2019-10-09

## 2019-10-16 ENCOUNTER — OTHER (OUTPATIENT)
Age: 84
End: 2019-10-16

## 2019-10-16 LAB — INR PPP: 1.9

## 2019-11-12 LAB — INR PPP: 1.79

## 2019-11-15 ENCOUNTER — TRANSCRIPTION ENCOUNTER (OUTPATIENT)
Age: 84
End: 2019-11-15

## 2019-11-21 ENCOUNTER — RX RENEWAL (OUTPATIENT)
Age: 84
End: 2019-11-21

## 2019-11-25 ENCOUNTER — CHART COPY (OUTPATIENT)
Age: 84
End: 2019-11-25

## 2019-11-27 LAB — INR PPP: 1.76

## 2019-12-13 LAB — INR PPP: 2.92

## 2019-12-19 ENCOUNTER — MEDICATION RENEWAL (OUTPATIENT)
Age: 84
End: 2019-12-19

## 2019-12-20 ENCOUNTER — MEDICATION RENEWAL (OUTPATIENT)
Age: 84
End: 2019-12-20

## 2019-12-20 ENCOUNTER — RX RENEWAL (OUTPATIENT)
Age: 84
End: 2019-12-20

## 2019-12-28 LAB — INR PPP: 1.9

## 2020-01-07 LAB — INR PPP: 2.8

## 2020-01-14 ENCOUNTER — APPOINTMENT (OUTPATIENT)
Dept: INTERNAL MEDICINE | Facility: CLINIC | Age: 85
End: 2020-01-14
Payer: MEDICARE

## 2020-01-14 VITALS
OXYGEN SATURATION: 92 % | HEIGHT: 63 IN | HEART RATE: 70 BPM | TEMPERATURE: 97 F | DIASTOLIC BLOOD PRESSURE: 78 MMHG | BODY MASS INDEX: 26.58 KG/M2 | WEIGHT: 150 LBS | SYSTOLIC BLOOD PRESSURE: 120 MMHG

## 2020-01-14 PROCEDURE — 99214 OFFICE O/P EST MOD 30 MIN: CPT | Mod: 25

## 2020-01-14 PROCEDURE — 36415 COLL VENOUS BLD VENIPUNCTURE: CPT

## 2020-01-14 NOTE — HISTORY OF PRESENT ILLNESS
[FreeTextEntry1] : f/u platelets, thyroid [de-identified] : 88-year-old female with history of atrial fibrillation and hypothyroidism. She is on Coumadin. Recently her INR has been going up and down. She didn't realize that the green vegetables affect her INR. She is reluctant to use any of the newer medications she has been on Coumadin without any problems of time.\par She denies any dizziness or palpitations. No headaches or chest pain

## 2020-01-14 NOTE — PLAN
[FreeTextEntry1] : She declines depression screening\par Blood pressure is controlled\par Check labs today including lipids and thyroid\par Continue Coumadin\par recheck platelets\par f/u 3 months

## 2020-01-16 ENCOUNTER — TRANSCRIPTION ENCOUNTER (OUTPATIENT)
Age: 85
End: 2020-01-16

## 2020-01-21 LAB
ALBUMIN SERPL ELPH-MCNC: 4.5 G/DL
ALP BLD-CCNC: 63 U/L
ALT SERPL-CCNC: 9 U/L
ANION GAP SERPL CALC-SCNC: 11 MMOL/L
AST SERPL-CCNC: 15 U/L
BASOPHILS # BLD AUTO: 0 K/UL
BASOPHILS NFR BLD AUTO: 0 %
BILIRUB SERPL-MCNC: 0.5 MG/DL
BUN SERPL-MCNC: 15 MG/DL
CALCIUM SERPL-MCNC: 9.4 MG/DL
CHLORIDE SERPL-SCNC: 105 MMOL/L
CHOLEST SERPL-MCNC: 168 MG/DL
CHOLEST/HDLC SERPL: 3.2 RATIO
CO2 SERPL-SCNC: 25 MMOL/L
CREAT SERPL-MCNC: 0.73 MG/DL
EOSINOPHIL # BLD AUTO: 0 K/UL
EOSINOPHIL NFR BLD AUTO: 0 %
GLUCOSE SERPL-MCNC: 108 MG/DL
HCT VFR BLD CALC: 39 %
HDLC SERPL-MCNC: 52 MG/DL
HGB BLD-MCNC: 11.6 G/DL
IMM GRANULOCYTES NFR BLD AUTO: 0.2 %
LDLC SERPL CALC-MCNC: 97 MG/DL
LYMPHOCYTES # BLD AUTO: 1.07 K/UL
LYMPHOCYTES NFR BLD AUTO: 21.9 %
MAN DIFF?: NORMAL
MCHC RBC-ENTMCNC: 26.9 PG
MCHC RBC-ENTMCNC: 29.7 GM/DL
MCV RBC AUTO: 90.5 FL
MONOCYTES # BLD AUTO: 0.38 K/UL
MONOCYTES NFR BLD AUTO: 7.8 %
NEUTROPHILS # BLD AUTO: 3.42 K/UL
NEUTROPHILS NFR BLD AUTO: 70.1 %
PLATELET # BLD AUTO: 118 K/UL
POTASSIUM SERPL-SCNC: 4.5 MMOL/L
PROT SERPL-MCNC: 6.5 G/DL
RBC # BLD: 4.31 M/UL
RBC # FLD: 14.1 %
SODIUM SERPL-SCNC: 141 MMOL/L
TRIGL SERPL-MCNC: 95 MG/DL
TSH SERPL-ACNC: 4.05 UIU/ML
WBC # FLD AUTO: 4.88 K/UL

## 2020-01-22 ENCOUNTER — RESULT REVIEW (OUTPATIENT)
Age: 85
End: 2020-01-22

## 2020-01-22 LAB — INR PPP: 2.25

## 2020-02-04 LAB — INR PPP: 3

## 2020-03-04 LAB — INR PPP: 2.23

## 2020-04-01 LAB — INR PPP: 2.98

## 2020-04-14 ENCOUNTER — APPOINTMENT (OUTPATIENT)
Dept: INTERNAL MEDICINE | Facility: CLINIC | Age: 85
End: 2020-04-14

## 2020-04-29 LAB — INR PPP: 3.99

## 2020-05-06 LAB — INR PPP: 2.64

## 2020-05-12 LAB — INR PPP: 4

## 2020-05-19 LAB — INR PPP: 4.06

## 2020-05-27 LAB — INR PPP: 2.6

## 2020-06-04 LAB — INR PPP: 3.09

## 2020-06-17 LAB — INR PPP: 2.62

## 2020-06-22 ENCOUNTER — APPOINTMENT (OUTPATIENT)
Dept: CARDIOLOGY | Facility: CLINIC | Age: 85
End: 2020-06-22
Payer: MEDICARE

## 2020-06-22 ENCOUNTER — NON-APPOINTMENT (OUTPATIENT)
Age: 85
End: 2020-06-22

## 2020-06-22 VITALS
DIASTOLIC BLOOD PRESSURE: 71 MMHG | SYSTOLIC BLOOD PRESSURE: 125 MMHG | HEART RATE: 75 BPM | WEIGHT: 148 LBS | OXYGEN SATURATION: 97 % | HEIGHT: 63 IN | BODY MASS INDEX: 26.22 KG/M2

## 2020-06-22 PROCEDURE — 93000 ELECTROCARDIOGRAM COMPLETE: CPT

## 2020-06-22 PROCEDURE — 99215 OFFICE O/P EST HI 40 MIN: CPT

## 2020-07-08 DIAGNOSIS — Z51.81 ENCOUNTER FOR THERAPEUTIC DRUG LVL MONITORING: ICD-10-CM

## 2020-07-08 DIAGNOSIS — Z79.01 ENCOUNTER FOR THERAPEUTIC DRUG LVL MONITORING: ICD-10-CM

## 2020-07-08 LAB — INR PPP: 2.19

## 2020-07-12 ENCOUNTER — RX RENEWAL (OUTPATIENT)
Age: 85
End: 2020-07-12

## 2020-08-05 LAB — INR PPP: 2.15

## 2020-09-02 LAB — INR PPP: 2.84

## 2020-09-10 ENCOUNTER — APPOINTMENT (OUTPATIENT)
Dept: INTERNAL MEDICINE | Facility: CLINIC | Age: 85
End: 2020-09-10
Payer: MEDICARE

## 2020-09-10 VITALS
TEMPERATURE: 97.4 F | SYSTOLIC BLOOD PRESSURE: 122 MMHG | BODY MASS INDEX: 25.87 KG/M2 | DIASTOLIC BLOOD PRESSURE: 70 MMHG | WEIGHT: 146 LBS | HEIGHT: 63 IN

## 2020-09-10 VITALS — SYSTOLIC BLOOD PRESSURE: 112 MMHG | DIASTOLIC BLOOD PRESSURE: 64 MMHG

## 2020-09-10 PROCEDURE — 36415 COLL VENOUS BLD VENIPUNCTURE: CPT

## 2020-09-10 PROCEDURE — 99214 OFFICE O/P EST MOD 30 MIN: CPT | Mod: 25

## 2020-09-10 NOTE — HEALTH RISK ASSESSMENT
[] : No [(PHQ-2) Unable to screen] : PHQ-2: unable to screen [UnableToScreenReason] : patient declines

## 2020-09-10 NOTE — HISTORY OF PRESENT ILLNESS
[FreeTextEntry1] : f/u thyroid and a. fib [de-identified] : 88-year-old female with history of atrial fibrillation and hypothyroidism. She is on Coumadin. No bleeding\par She denies any dizziness or palpitations. No headaches or chest pain\par Flu shot done in the pharmacy

## 2020-09-11 LAB
ALBUMIN SERPL ELPH-MCNC: 4.4 G/DL
ALP BLD-CCNC: 64 U/L
ALT SERPL-CCNC: 9 U/L
ANION GAP SERPL CALC-SCNC: 11 MMOL/L
AST SERPL-CCNC: 19 U/L
BASOPHILS # BLD AUTO: 0 K/UL
BASOPHILS NFR BLD AUTO: 0 %
BILIRUB SERPL-MCNC: 0.5 MG/DL
BUN SERPL-MCNC: 16 MG/DL
CALCIUM SERPL-MCNC: 9 MG/DL
CHLORIDE SERPL-SCNC: 108 MMOL/L
CHOLEST SERPL-MCNC: 129 MG/DL
CHOLEST/HDLC SERPL: 2.8 RATIO
CO2 SERPL-SCNC: 23 MMOL/L
CREAT SERPL-MCNC: 0.53 MG/DL
DIGOXIN SERPL-MCNC: 0.8 NG/ML
EOSINOPHIL # BLD AUTO: 0 K/UL
EOSINOPHIL NFR BLD AUTO: 0 %
ESTIMATED AVERAGE GLUCOSE: 131 MG/DL
GLUCOSE SERPL-MCNC: 106 MG/DL
HBA1C MFR BLD HPLC: 6.2 %
HCT VFR BLD CALC: 31.7 %
HDLC SERPL-MCNC: 47 MG/DL
HGB BLD-MCNC: 8.7 G/DL
IMM GRANULOCYTES NFR BLD AUTO: 0.2 %
LDLC SERPL CALC-MCNC: 67 MG/DL
LYMPHOCYTES # BLD AUTO: 1.17 K/UL
LYMPHOCYTES NFR BLD AUTO: 23.8 %
MAN DIFF?: NORMAL
MCHC RBC-ENTMCNC: 20.8 PG
MCHC RBC-ENTMCNC: 27.4 GM/DL
MCV RBC AUTO: 75.8 FL
MONOCYTES # BLD AUTO: 0.46 K/UL
MONOCYTES NFR BLD AUTO: 9.4 %
NEUTROPHILS # BLD AUTO: 3.27 K/UL
NEUTROPHILS NFR BLD AUTO: 66.6 %
PLATELET # BLD AUTO: 135 K/UL
POTASSIUM SERPL-SCNC: 4.3 MMOL/L
PROT SERPL-MCNC: 6.4 G/DL
RBC # BLD: 4.18 M/UL
RBC # FLD: 18.3 %
SODIUM SERPL-SCNC: 142 MMOL/L
TRIGL SERPL-MCNC: 77 MG/DL
TSH SERPL-ACNC: 2.96 UIU/ML
WBC # FLD AUTO: 4.91 K/UL

## 2020-09-17 ENCOUNTER — APPOINTMENT (OUTPATIENT)
Dept: INTERNAL MEDICINE | Facility: CLINIC | Age: 85
End: 2020-09-17
Payer: MEDICARE

## 2020-09-17 VITALS
HEIGHT: 63 IN | SYSTOLIC BLOOD PRESSURE: 110 MMHG | DIASTOLIC BLOOD PRESSURE: 70 MMHG | WEIGHT: 146 LBS | TEMPERATURE: 98 F | BODY MASS INDEX: 25.87 KG/M2

## 2020-09-17 VITALS — SYSTOLIC BLOOD PRESSURE: 100 MMHG | DIASTOLIC BLOOD PRESSURE: 70 MMHG

## 2020-09-17 DIAGNOSIS — Z79.01 LONG TERM (CURRENT) USE OF ANTICOAGULANTS: ICD-10-CM

## 2020-09-17 PROCEDURE — 36415 COLL VENOUS BLD VENIPUNCTURE: CPT

## 2020-09-17 PROCEDURE — 99213 OFFICE O/P EST LOW 20 MIN: CPT | Mod: 25

## 2020-09-17 NOTE — HISTORY OF PRESENT ILLNESS
[FreeTextEntry1] : Anemia [de-identified] : She is here today to repeat her CBC.  Her labs last week showed a hemoglobin of 8.7.  She denies any bleeding.  She has not noted any dark stool.  She denies lightheadedness, chest pain, palpitations and headache. She is on coumadin.

## 2020-09-17 NOTE — PLAN
[FreeTextEntry1] : Recheck CBC today\par will call tomorrow with results. She should go to the ER if she notes any bleeding

## 2020-09-18 LAB
BASOPHILS # BLD AUTO: 0 K/UL
BASOPHILS NFR BLD AUTO: 0 %
EOSINOPHIL # BLD AUTO: 0.15 K/UL
EOSINOPHIL NFR BLD AUTO: 2.7 %
HCT VFR BLD CALC: 31.7 %
HGB BLD-MCNC: 8.6 G/DL
IMM GRANULOCYTES NFR BLD AUTO: 0.4 %
LYMPHOCYTES # BLD AUTO: 1.02 K/UL
LYMPHOCYTES NFR BLD AUTO: 18.2 %
MAN DIFF?: NORMAL
MCHC RBC-ENTMCNC: 21.3 PG
MCHC RBC-ENTMCNC: 27.1 GM/DL
MCV RBC AUTO: 78.5 FL
MONOCYTES # BLD AUTO: 0.42 K/UL
MONOCYTES NFR BLD AUTO: 7.5 %
NEUTROPHILS # BLD AUTO: 3.99 K/UL
NEUTROPHILS NFR BLD AUTO: 71.2 %
PLATELET # BLD AUTO: 112 K/UL
RBC # BLD: 4.04 M/UL
RBC # FLD: 18.9 %
WBC # FLD AUTO: 5.6 K/UL

## 2020-09-29 LAB — INR PPP: 2.12

## 2020-10-28 LAB — INR PPP: 2

## 2020-11-18 LAB — INR PPP: 1.87

## 2020-12-04 LAB — INR PPP: 2.05

## 2020-12-11 ENCOUNTER — APPOINTMENT (OUTPATIENT)
Dept: INTERNAL MEDICINE | Facility: CLINIC | Age: 85
End: 2020-12-11
Payer: MEDICARE

## 2020-12-11 ENCOUNTER — LABORATORY RESULT (OUTPATIENT)
Age: 85
End: 2020-12-11

## 2020-12-11 VITALS
BODY MASS INDEX: 25.34 KG/M2 | WEIGHT: 143 LBS | SYSTOLIC BLOOD PRESSURE: 130 MMHG | HEIGHT: 63 IN | DIASTOLIC BLOOD PRESSURE: 70 MMHG | TEMPERATURE: 98.2 F

## 2020-12-11 PROCEDURE — 99214 OFFICE O/P EST MOD 30 MIN: CPT | Mod: 25

## 2020-12-11 PROCEDURE — 36415 COLL VENOUS BLD VENIPUNCTURE: CPT

## 2020-12-11 NOTE — PLAN
[FreeTextEntry1] : BP is stable\par Continue metoprolol, digoxin and coumadin for A. fib\par check CBC, CMP, Lipids, TSH, iron\par If Hb is not improved she should see GI\par f/u 3 months

## 2020-12-11 NOTE — HISTORY OF PRESENT ILLNESS
[FreeTextEntry1] : f/u anemia, thyroid, lipids [de-identified] : 89-year-old female with history of atrial fibrillation on Coumadin and hypothyroidism who is here for follow-up.  Last visit she was found to be anemic with a hemoglobin of 8.7.  It was repeated a week later and hemoglobin was 8.6.  She was advised to see GI but she did not.  She denied bleeding at the time.  She started eating more iron rich foods and taking an OTC iron supplement. But unable to eat green leafy vegetables due to coumadin\par On coumadin. no bleeding noted\par No chest pain, palpitations or shortness of breath\par No change in weight, temperature intolerance, change in bowel movements

## 2020-12-17 LAB
ALBUMIN SERPL ELPH-MCNC: 4.7 G/DL
ALP BLD-CCNC: 67 U/L
ALT SERPL-CCNC: 11 U/L
ANION GAP SERPL CALC-SCNC: 16 MMOL/L
AST SERPL-CCNC: 15 U/L
BASOPHILS # BLD AUTO: 0.01 K/UL
BASOPHILS NFR BLD AUTO: 0.2 %
BILIRUB SERPL-MCNC: 0.4 MG/DL
BUN SERPL-MCNC: 16 MG/DL
CALCIUM SERPL-MCNC: 9.5 MG/DL
CHLORIDE SERPL-SCNC: 106 MMOL/L
CHOLEST SERPL-MCNC: 197 MG/DL
CO2 SERPL-SCNC: 23 MMOL/L
CREAT SERPL-MCNC: 0.75 MG/DL
EOSINOPHIL # BLD AUTO: 0 K/UL
EOSINOPHIL NFR BLD AUTO: 0 %
ESTIMATED AVERAGE GLUCOSE: 126 MG/DL
FERRITIN SERPL-MCNC: 50 NG/ML
GLUCOSE SERPL-MCNC: 119 MG/DL
HBA1C MFR BLD HPLC: 6 %
HCT VFR BLD CALC: 46 %
HDLC SERPL-MCNC: 61 MG/DL
HGB BLD-MCNC: 13.7 G/DL
IMM GRANULOCYTES NFR BLD AUTO: 0.2 %
IRON SATN MFR SERPL: 83 %
IRON SERPL-MCNC: 317 UG/DL
LDLC SERPL CALC-MCNC: 114 MG/DL
LYMPHOCYTES # BLD AUTO: 1.2 K/UL
LYMPHOCYTES NFR BLD AUTO: 18.1 %
MAN DIFF?: NORMAL
MCHC RBC-ENTMCNC: 27.1 PG
MCHC RBC-ENTMCNC: 29.8 GM/DL
MCV RBC AUTO: 91.1 FL
MONOCYTES # BLD AUTO: 0.51 K/UL
MONOCYTES NFR BLD AUTO: 7.7 %
NEUTROPHILS # BLD AUTO: 4.89 K/UL
NEUTROPHILS NFR BLD AUTO: 73.8 %
NONHDLC SERPL-MCNC: 136 MG/DL
PLATELET # BLD AUTO: 116 K/UL
POTASSIUM SERPL-SCNC: 4.2 MMOL/L
PROT SERPL-MCNC: 6.8 G/DL
RBC # BLD: 5.05 M/UL
RBC # FLD: 21.2 %
SODIUM SERPL-SCNC: 145 MMOL/L
TIBC SERPL-MCNC: 381 UG/DL
TRIGL SERPL-MCNC: 111 MG/DL
TSH SERPL-ACNC: 10.4 UIU/ML
UIBC SERPL-MCNC: 64 UG/DL
WBC # FLD AUTO: 6.62 K/UL

## 2020-12-23 LAB — INR PPP: 2.24

## 2021-01-05 LAB — INR PPP: 2.68

## 2021-01-21 ENCOUNTER — NON-APPOINTMENT (OUTPATIENT)
Age: 86
End: 2021-01-21

## 2021-01-21 ENCOUNTER — APPOINTMENT (OUTPATIENT)
Dept: CARDIOLOGY | Facility: CLINIC | Age: 86
End: 2021-01-21
Payer: MEDICARE

## 2021-01-21 VITALS
HEIGHT: 63 IN | WEIGHT: 147 LBS | BODY MASS INDEX: 26.05 KG/M2 | OXYGEN SATURATION: 97 % | DIASTOLIC BLOOD PRESSURE: 73 MMHG | HEART RATE: 68 BPM | SYSTOLIC BLOOD PRESSURE: 131 MMHG

## 2021-01-21 PROCEDURE — 99215 OFFICE O/P EST HI 40 MIN: CPT

## 2021-01-21 PROCEDURE — 93000 ELECTROCARDIOGRAM COMPLETE: CPT

## 2021-02-05 LAB — INR PPP: 3.21

## 2021-02-10 ENCOUNTER — APPOINTMENT (OUTPATIENT)
Dept: CARDIOLOGY | Facility: CLINIC | Age: 86
End: 2021-02-10
Payer: MEDICARE

## 2021-02-10 PROCEDURE — 93880 EXTRACRANIAL BILAT STUDY: CPT

## 2021-02-10 PROCEDURE — 93306 TTE W/DOPPLER COMPLETE: CPT

## 2021-02-19 LAB — INR PPP: 2.24

## 2021-03-12 ENCOUNTER — LABORATORY RESULT (OUTPATIENT)
Age: 86
End: 2021-03-12

## 2021-03-12 ENCOUNTER — APPOINTMENT (OUTPATIENT)
Dept: INTERNAL MEDICINE | Facility: CLINIC | Age: 86
End: 2021-03-12
Payer: MEDICARE

## 2021-03-12 VITALS
BODY MASS INDEX: 25.69 KG/M2 | SYSTOLIC BLOOD PRESSURE: 120 MMHG | WEIGHT: 145 LBS | DIASTOLIC BLOOD PRESSURE: 80 MMHG | HEIGHT: 63 IN | TEMPERATURE: 97.1 F

## 2021-03-12 DIAGNOSIS — Z12.31 ENCOUNTER FOR SCREENING MAMMOGRAM FOR MALIGNANT NEOPLASM OF BREAST: ICD-10-CM

## 2021-03-12 DIAGNOSIS — D50.9 IRON DEFICIENCY ANEMIA, UNSPECIFIED: ICD-10-CM

## 2021-03-12 PROCEDURE — 99214 OFFICE O/P EST MOD 30 MIN: CPT | Mod: 25

## 2021-03-12 PROCEDURE — 36415 COLL VENOUS BLD VENIPUNCTURE: CPT

## 2021-03-12 NOTE — HISTORY OF PRESENT ILLNESS
[FreeTextEntry1] : f/u anemia, thyroid, lipids [de-identified] : 89-year-old female with history of atrial fibrillation on Coumadin and hypothyroidism who is here for follow-up. lat visit her TSH was elevated. She was taking the synthroid and iron at the same time. She is now taking synthroid first thing in the morning by itself.\par Saw cardiology in january. Everything was ok\par On coumadin. no bleeding noted\par No chest pain, palpitations or shortness of breath\par No change in weight, temperature intolerance, change in bowel movements\par

## 2021-03-12 NOTE — PLAN
[FreeTextEntry1] : BP is stable\par Continue metoprolol, digoxin and coumadin for A. fib\par check CBC, CMP, Lipids, TSH\par Refill synthroid as labs dictate\par f/u 3 months

## 2021-03-12 NOTE — PHYSICAL EXAM
[Well Nourished] : well nourished [Normal Sclera/Conjunctiva] : normal sclera/conjunctiva [Normal Outer Ear/Nose] : the outer ears and nose were normal in appearance [No Respiratory Distress] : no respiratory distress  [No Accessory Muscle Use] : no accessory muscle use [Clear to Auscultation] : lungs were clear to auscultation bilaterally [Normal Rate] : normal rate  [Regular Rhythm] : with a regular rhythm [Normal S1, S2] : normal S1 and S2 [No Edema] : there was no peripheral edema [Coordination Grossly Intact] : coordination grossly intact [Normal Gait] : normal gait [Normal Affect] : the affect was normal [Normal Insight/Judgement] : insight and judgment were intact [No Acute Distress] : no acute distress

## 2021-03-15 LAB
ALBUMIN SERPL ELPH-MCNC: 4.5 G/DL
ALP BLD-CCNC: 68 U/L
ALT SERPL-CCNC: 13 U/L
ANION GAP SERPL CALC-SCNC: 9 MMOL/L
AST SERPL-CCNC: 15 U/L
BASOPHILS # BLD AUTO: 0 K/UL
BASOPHILS NFR BLD AUTO: 0 %
BILIRUB SERPL-MCNC: 0.8 MG/DL
BUN SERPL-MCNC: 17 MG/DL
CALCIUM SERPL-MCNC: 9.7 MG/DL
CHLORIDE SERPL-SCNC: 107 MMOL/L
CHOLEST SERPL-MCNC: 157 MG/DL
CO2 SERPL-SCNC: 27 MMOL/L
CREAT SERPL-MCNC: 0.57 MG/DL
EOSINOPHIL # BLD AUTO: 0 K/UL
EOSINOPHIL NFR BLD AUTO: 0 %
ESTIMATED AVERAGE GLUCOSE: 120 MG/DL
GLUCOSE SERPL-MCNC: 121 MG/DL
HBA1C MFR BLD HPLC: 5.8 %
HCT VFR BLD CALC: 42.1 %
HDLC SERPL-MCNC: 49 MG/DL
HGB BLD-MCNC: 13.5 G/DL
IMM GRANULOCYTES NFR BLD AUTO: 0.3 %
LDLC SERPL CALC-MCNC: 91 MG/DL
LYMPHOCYTES # BLD AUTO: 1.06 K/UL
LYMPHOCYTES NFR BLD AUTO: 14.8 %
MAN DIFF?: NORMAL
MCHC RBC-ENTMCNC: 30.1 PG
MCHC RBC-ENTMCNC: 32.1 GM/DL
MCV RBC AUTO: 94 FL
MONOCYTES # BLD AUTO: 0.52 K/UL
MONOCYTES NFR BLD AUTO: 7.3 %
NEUTROPHILS # BLD AUTO: 5.54 K/UL
NEUTROPHILS NFR BLD AUTO: 77.6 %
NONHDLC SERPL-MCNC: 108 MG/DL
PLATELET # BLD AUTO: 111 K/UL
POTASSIUM SERPL-SCNC: 4.4 MMOL/L
PROT SERPL-MCNC: 6.7 G/DL
RBC # BLD: 4.48 M/UL
RBC # FLD: 13 %
SODIUM SERPL-SCNC: 143 MMOL/L
TRIGL SERPL-MCNC: 83 MG/DL
TSH SERPL-ACNC: 0.12 UIU/ML
WBC # FLD AUTO: 7.14 K/UL

## 2021-03-18 LAB — INR PPP: 3.1

## 2021-04-16 LAB — INR PPP: 2.03

## 2021-05-13 LAB — INR PPP: 1.82

## 2021-06-02 LAB — INR PPP: 1.84

## 2021-06-03 ENCOUNTER — LABORATORY RESULT (OUTPATIENT)
Age: 86
End: 2021-06-03

## 2021-06-03 ENCOUNTER — APPOINTMENT (OUTPATIENT)
Dept: INTERNAL MEDICINE | Facility: CLINIC | Age: 86
End: 2021-06-03
Payer: MEDICARE

## 2021-06-03 VITALS
OXYGEN SATURATION: 97 % | HEIGHT: 60 IN | TEMPERATURE: 96.3 F | BODY MASS INDEX: 29.45 KG/M2 | HEART RATE: 60 BPM | WEIGHT: 150 LBS | DIASTOLIC BLOOD PRESSURE: 80 MMHG | SYSTOLIC BLOOD PRESSURE: 110 MMHG

## 2021-06-03 DIAGNOSIS — M85.80 OTHER SPECIFIED DISORDERS OF BONE DENSITY AND STRUCTURE, UNSPECIFIED SITE: ICD-10-CM

## 2021-06-03 DIAGNOSIS — K86.2 CYST OF PANCREAS: ICD-10-CM

## 2021-06-03 DIAGNOSIS — Z00.00 ENCOUNTER FOR GENERAL ADULT MEDICAL EXAMINATION W/OUT ABNORMAL FINDINGS: ICD-10-CM

## 2021-06-03 PROCEDURE — 36415 COLL VENOUS BLD VENIPUNCTURE: CPT

## 2021-06-03 PROCEDURE — G0439: CPT

## 2021-06-03 NOTE — HEALTH RISK ASSESSMENT
[] : No [No] : No [No falls in past year] : Patient reported no falls in the past year [0] : 2) Feeling down, depressed, or hopeless: Not at all (0) [[id number]] : 0 [Alone] : lives alone [] :  [# Of Children ___] : has [unfilled] children [Fully functional (bathing, dressing, toileting, transferring, walking, feeding)] : Fully functional (bathing, dressing, toileting, transferring, walking, feeding) [Fully functional (using the telephone, shopping, preparing meals, housekeeping, doing laundry, using] : Fully functional and needs no help or supervision to perform IADLs (using the telephone, shopping, preparing meals, housekeeping, doing laundry, using transportation, managing medications and managing finances) [Reports changes in hearing] : Reports no changes in hearing [Reports changes in vision] : Reports no changes in vision [Reports changes in dental health] : Reports no changes in dental health [With Patient/Caregiver] : With Patient/Caregiver [Designated Healthcare Proxy] : Designated healthcare proxy [Name: ___] : Health Care Proxy's Name: [unfilled]  [Relationship: ___] : Relationship: [unfilled] [DNR] : DNR [DNI] : DNI [AdvancecareDate] : 06/21

## 2021-06-03 NOTE — PHYSICAL EXAM
[No Acute Distress] : no acute distress [Well Nourished] : well nourished [Normal Sclera/Conjunctiva] : normal sclera/conjunctiva [Normal Outer Ear/Nose] : the outer ears and nose were normal in appearance [No Respiratory Distress] : no respiratory distress  [No Accessory Muscle Use] : no accessory muscle use [Clear to Auscultation] : lungs were clear to auscultation bilaterally [Normal Rate] : normal rate  [Regular Rhythm] : with a regular rhythm [Normal S1, S2] : normal S1 and S2 [No Edema] : there was no peripheral edema [Coordination Grossly Intact] : coordination grossly intact [Normal Gait] : normal gait [Normal Affect] : the affect was normal [Alert and Oriented x3] : oriented to person, place, and time [Normal Insight/Judgement] : insight and judgment were intact

## 2021-06-03 NOTE — HISTORY OF PRESENT ILLNESS
[de-identified] : 89-year-old female with history of atrial fibrillation on Coumadin and hypothyroidism who is here for follow-up. She started taking synthroid by itself and her TSH was low. Her dose of Synthroid was reduced to 75 mcg. She feels fine on this dose. no palpitations, jitteriness, increased fatigue, change in weight or BM\par Saw cardiology in january. Everything was ok. her last INR was a little subtherapeutic. Her dose of coumadin was increased. no bleeding noted\par No chest pain, palpitations or shortness of breath\par She had both doses of the Pfizer COVID vaccine\par She had an MRI of her abdomen done 2 years ago which revealed pancreatic cyst.  The radiologist recommended repeat MRI in 2 years. [FreeTextEntry1] : f/u anemia, thyroid, lipids

## 2021-06-03 NOTE — HISTORY OF PRESENT ILLNESS
[de-identified] : 89-year-old female with history of atrial fibrillation on Coumadin and hypothyroidism who is here for follow-up. She started taking synthroid by itself and her TSH was low. Her dose of Synthroid was reduced to 75 mcg. She feels fine on this dose. no palpitations, jitteriness, increased fatigue, change in weight or BM\par Saw cardiology in january. Everything was ok. her last INR was a little subtherapeutic. Her dose of coumadin was increased. no bleeding noted\par No chest pain, palpitations or shortness of breath\par She had both doses of the Pfizer COVID vaccine\par She had an MRI of her abdomen done 2 years ago which revealed pancreatic cyst.  The radiologist recommended repeat MRI in 2 years. [FreeTextEntry1] : f/u anemia, thyroid, lipids

## 2021-06-03 NOTE — HEALTH RISK ASSESSMENT
[] : No [No] : No [No falls in past year] : Patient reported no falls in the past year [0] : 2) Feeling down, depressed, or hopeless: Not at all (0) [[id number]] : 0 [Alone] : lives alone [] :  [# Of Children ___] : has [unfilled] children [Fully functional (bathing, dressing, toileting, transferring, walking, feeding)] : Fully functional (bathing, dressing, toileting, transferring, walking, feeding) [Fully functional (using the telephone, shopping, preparing meals, housekeeping, doing laundry, using] : Fully functional and needs no help or supervision to perform IADLs (using the telephone, shopping, preparing meals, housekeeping, doing laundry, using transportation, managing medications and managing finances) [Reports changes in hearing] : Reports no changes in hearing [Reports changes in vision] : Reports no changes in vision [Reports changes in dental health] : Reports no changes in dental health [With Patient/Caregiver] : With Patient/Caregiver [Designated Healthcare Proxy] : Designated healthcare proxy [Name: ___] : Health Care Proxy's Name: [unfilled]  [Relationship: ___] : Relationship: [unfilled] [DNR] : DNR [DNI] : DNI [AdvancecareDate] : 06/21

## 2021-06-03 NOTE — PLAN
[FreeTextEntry1] : BP is stable\par Continue metoprolol, digoxin and coumadin for A. fib\par check CBC, CMP, Lipids, TSH\par Refill synthroid as labs dictate\par She is declining to repeat the MRI of her abdomen.  She states that she is 89 years old and has a good quality of life and would not intervene on anything that is found.\par f/u 3 months

## 2021-06-07 LAB
ALBUMIN SERPL ELPH-MCNC: 4.6 G/DL
ALP BLD-CCNC: 74 U/L
ALT SERPL-CCNC: 13 U/L
ANION GAP SERPL CALC-SCNC: 12 MMOL/L
AST SERPL-CCNC: 18 U/L
BASOPHILS # BLD AUTO: 0.01 K/UL
BASOPHILS NFR BLD AUTO: 0.1 %
BILIRUB SERPL-MCNC: 0.8 MG/DL
BUN SERPL-MCNC: 17 MG/DL
CALCIUM SERPL-MCNC: 9.9 MG/DL
CHLORIDE SERPL-SCNC: 106 MMOL/L
CHOLEST SERPL-MCNC: 169 MG/DL
CO2 SERPL-SCNC: 25 MMOL/L
CREAT SERPL-MCNC: 0.66 MG/DL
EOSINOPHIL # BLD AUTO: 0 K/UL
EOSINOPHIL NFR BLD AUTO: 0 %
ESTIMATED AVERAGE GLUCOSE: 123 MG/DL
GLUCOSE SERPL-MCNC: 113 MG/DL
HBA1C MFR BLD HPLC: 5.9 %
HCT VFR BLD CALC: 43.6 %
HDLC SERPL-MCNC: 55 MG/DL
HGB BLD-MCNC: 13.8 G/DL
IMM GRANULOCYTES NFR BLD AUTO: 0.4 %
LDLC SERPL CALC-MCNC: 98 MG/DL
LYMPHOCYTES # BLD AUTO: 1.29 K/UL
LYMPHOCYTES NFR BLD AUTO: 17.7 %
MAN DIFF?: NORMAL
MCHC RBC-ENTMCNC: 30.3 PG
MCHC RBC-ENTMCNC: 31.7 GM/DL
MCV RBC AUTO: 95.8 FL
MONOCYTES # BLD AUTO: 0.53 K/UL
MONOCYTES NFR BLD AUTO: 7.3 %
NEUTROPHILS # BLD AUTO: 5.43 K/UL
NEUTROPHILS NFR BLD AUTO: 74.5 %
NONHDLC SERPL-MCNC: 114 MG/DL
PLATELET # BLD AUTO: 111 K/UL
POTASSIUM SERPL-SCNC: 4.9 MMOL/L
PROT SERPL-MCNC: 6.9 G/DL
RBC # BLD: 4.55 M/UL
RBC # FLD: 13.4 %
SODIUM SERPL-SCNC: 144 MMOL/L
TRIGL SERPL-MCNC: 81 MG/DL
TSH SERPL-ACNC: 2.56 UIU/ML
WBC # FLD AUTO: 7.29 K/UL

## 2021-06-11 ENCOUNTER — NON-APPOINTMENT (OUTPATIENT)
Age: 86
End: 2021-06-11

## 2021-06-18 LAB — INR PPP: 2.12

## 2021-07-09 LAB — INR PPP: 1.8

## 2021-07-16 LAB — INR PPP: 1.83

## 2021-07-28 LAB — INR PPP: 3.57

## 2021-08-06 LAB — INR PPP: 2.67

## 2021-08-20 LAB — INR PPP: 2.6

## 2021-09-01 ENCOUNTER — RX RENEWAL (OUTPATIENT)
Age: 86
End: 2021-09-01

## 2021-09-08 LAB — INR PPP: 2.75

## 2021-09-16 ENCOUNTER — APPOINTMENT (OUTPATIENT)
Dept: INTERNAL MEDICINE | Facility: CLINIC | Age: 86
End: 2021-09-16
Payer: MEDICARE

## 2021-09-16 ENCOUNTER — LABORATORY RESULT (OUTPATIENT)
Age: 86
End: 2021-09-16

## 2021-09-16 VITALS
DIASTOLIC BLOOD PRESSURE: 70 MMHG | HEIGHT: 63 IN | BODY MASS INDEX: 26.4 KG/M2 | TEMPERATURE: 97.9 F | WEIGHT: 149 LBS | SYSTOLIC BLOOD PRESSURE: 110 MMHG

## 2021-09-16 PROCEDURE — 99214 OFFICE O/P EST MOD 30 MIN: CPT | Mod: 25

## 2021-09-16 PROCEDURE — 36415 COLL VENOUS BLD VENIPUNCTURE: CPT

## 2021-09-16 NOTE — HISTORY OF PRESENT ILLNESS
[de-identified] : 89-year-old female with history of atrial fibrillation on Coumadin and hypothyroidism who is here for follow-up. She started taking synthroid by itself and her TSH was low. Her dose of Synthroid was reduced to 75 mcg. She feels fine on this dose. no palpitations, jitteriness, increased fatigue, change in weight or BM\par She is back to checking her INR monthly. She eats broccoli and was not being consistent with it and needed more frequent INR checks. no bleeding\par No chest pain, palpitations or shortness of breath\par She has started line dancing and will be restarting you also\par She had both doses of the Pfizer COVID vaccine\par

## 2021-09-16 NOTE — HEALTH RISK ASSESSMENT
[] : No [0] : 2) Feeling down, depressed, or hopeless: Not at all (0) [PHQ-2 Negative - No further assessment needed] : PHQ-2 Negative - No further assessment needed [[id number]] : 0

## 2021-09-16 NOTE — PHYSICAL EXAM
[No Acute Distress] : no acute distress [Well Nourished] : well nourished [Well Developed] : well developed [Normal Sclera/Conjunctiva] : normal sclera/conjunctiva [Normal Outer Ear/Nose] : the outer ears and nose were normal in appearance [No Respiratory Distress] : no respiratory distress  [No Accessory Muscle Use] : no accessory muscle use [Clear to Auscultation] : lungs were clear to auscultation bilaterally [Normal Rate] : normal rate  [Regular Rhythm] : with a regular rhythm [Normal S1, S2] : normal S1 and S2 [No Edema] : there was no peripheral edema [Coordination Grossly Intact] : coordination grossly intact [Normal Gait] : normal gait [Normal Affect] : the affect was normal [Alert and Oriented x3] : oriented to person, place, and time [Normal Insight/Judgement] : insight and judgment were intact

## 2021-09-17 ENCOUNTER — NON-APPOINTMENT (OUTPATIENT)
Age: 86
End: 2021-09-17

## 2021-09-17 LAB
ALBUMIN SERPL ELPH-MCNC: 4.4 G/DL
ALP BLD-CCNC: 73 U/L
ALT SERPL-CCNC: 10 U/L
ANION GAP SERPL CALC-SCNC: 14 MMOL/L
AST SERPL-CCNC: 15 U/L
BASOPHILS # BLD AUTO: 0 K/UL
BASOPHILS NFR BLD AUTO: 0 %
BILIRUB SERPL-MCNC: 0.6 MG/DL
BUN SERPL-MCNC: 11 MG/DL
CALCIUM SERPL-MCNC: 9.3 MG/DL
CHLORIDE SERPL-SCNC: 107 MMOL/L
CO2 SERPL-SCNC: 22 MMOL/L
CREAT SERPL-MCNC: 0.61 MG/DL
EOSINOPHIL # BLD AUTO: 0 K/UL
EOSINOPHIL NFR BLD AUTO: 0 %
ESTIMATED AVERAGE GLUCOSE: 120 MG/DL
GLUCOSE SERPL-MCNC: 112 MG/DL
HBA1C MFR BLD HPLC: 5.8 %
HCT VFR BLD CALC: 40.5 %
HGB BLD-MCNC: 12.6 G/DL
IMM GRANULOCYTES NFR BLD AUTO: 0.2 %
LYMPHOCYTES # BLD AUTO: 0.99 K/UL
LYMPHOCYTES NFR BLD AUTO: 17.3 %
MAN DIFF?: NORMAL
MCHC RBC-ENTMCNC: 29.9 PG
MCHC RBC-ENTMCNC: 31.1 GM/DL
MCV RBC AUTO: 96.2 FL
MONOCYTES # BLD AUTO: 0.48 K/UL
MONOCYTES NFR BLD AUTO: 8.4 %
NEUTROPHILS # BLD AUTO: 4.25 K/UL
NEUTROPHILS NFR BLD AUTO: 74.1 %
PLATELET # BLD AUTO: 87 K/UL
POTASSIUM SERPL-SCNC: 4.5 MMOL/L
PROT SERPL-MCNC: 6.3 G/DL
RBC # BLD: 4.21 M/UL
RBC # FLD: 13.2 %
SODIUM SERPL-SCNC: 143 MMOL/L
TSH SERPL-ACNC: 5.02 UIU/ML
WBC # FLD AUTO: 5.73 K/UL

## 2021-09-17 RX ORDER — LEVOTHYROXINE SODIUM 0.07 MG/1
75 TABLET ORAL
Qty: 90 | Refills: 1 | Status: DISCONTINUED | COMMUNITY
Start: 2021-03-15 | End: 2021-09-17

## 2021-09-24 ENCOUNTER — APPOINTMENT (OUTPATIENT)
Dept: INTERNAL MEDICINE | Facility: CLINIC | Age: 86
End: 2021-09-24
Payer: MEDICARE

## 2021-09-24 VITALS
DIASTOLIC BLOOD PRESSURE: 68 MMHG | SYSTOLIC BLOOD PRESSURE: 112 MMHG | HEIGHT: 63 IN | BODY MASS INDEX: 26.58 KG/M2 | WEIGHT: 150 LBS | TEMPERATURE: 97.7 F

## 2021-09-24 PROCEDURE — 99212 OFFICE O/P EST SF 10 MIN: CPT | Mod: 25

## 2021-09-24 PROCEDURE — 36415 COLL VENOUS BLD VENIPUNCTURE: CPT

## 2021-09-24 NOTE — HISTORY OF PRESENT ILLNESS
[FreeTextEntry1] : repeat labs [de-identified] : She is here to repeat the CBC. her platelets were 87. She is on coumadin. no bleeding or clotting\par She also requests to check her lipids. She is not fasting

## 2021-09-27 LAB
BASOPHILS # BLD AUTO: 0.01 K/UL
BASOPHILS NFR BLD AUTO: 0.1 %
CHOLEST SERPL-MCNC: 158 MG/DL
EOSINOPHIL # BLD AUTO: 0 K/UL
EOSINOPHIL NFR BLD AUTO: 0 %
HCT VFR BLD CALC: 41.9 %
HDLC SERPL-MCNC: 48 MG/DL
HGB BLD-MCNC: 12.8 G/DL
IMM GRANULOCYTES NFR BLD AUTO: 0.4 %
LDLC SERPL CALC-MCNC: 89 MG/DL
LYMPHOCYTES # BLD AUTO: 1.16 K/UL
LYMPHOCYTES NFR BLD AUTO: 16.7 %
MAN DIFF?: NORMAL
MCHC RBC-ENTMCNC: 30.3 PG
MCHC RBC-ENTMCNC: 30.5 GM/DL
MCV RBC AUTO: 99.3 FL
MONOCYTES # BLD AUTO: 0.5 K/UL
MONOCYTES NFR BLD AUTO: 7.2 %
NEUTROPHILS # BLD AUTO: 5.25 K/UL
NEUTROPHILS NFR BLD AUTO: 75.6 %
NONHDLC SERPL-MCNC: 110 MG/DL
PLATELET # BLD AUTO: 126 K/UL
RBC # BLD: 4.22 M/UL
RBC # FLD: 13.5 %
TRIGL SERPL-MCNC: 107 MG/DL
WBC # FLD AUTO: 6.95 K/UL

## 2021-10-06 LAB — INR PPP: 2.86

## 2021-11-03 LAB — INR PPP: 2.62

## 2021-12-08 LAB — INR PPP: 3.67

## 2021-12-14 ENCOUNTER — NON-APPOINTMENT (OUTPATIENT)
Age: 86
End: 2021-12-14

## 2021-12-14 ENCOUNTER — APPOINTMENT (OUTPATIENT)
Dept: CARDIOLOGY | Facility: CLINIC | Age: 86
End: 2021-12-14
Payer: MEDICARE

## 2021-12-14 VITALS
OXYGEN SATURATION: 97 % | BODY MASS INDEX: 25.93 KG/M2 | SYSTOLIC BLOOD PRESSURE: 117 MMHG | DIASTOLIC BLOOD PRESSURE: 71 MMHG | HEIGHT: 63 IN | WEIGHT: 146.31 LBS | HEART RATE: 78 BPM

## 2021-12-14 VITALS — HEART RATE: 78 BPM | HEIGHT: 63 IN | OXYGEN SATURATION: 97 %

## 2021-12-14 PROCEDURE — 93000 ELECTROCARDIOGRAM COMPLETE: CPT

## 2021-12-14 PROCEDURE — 85610 PROTHROMBIN TIME: CPT | Mod: QW

## 2021-12-14 PROCEDURE — 99215 OFFICE O/P EST HI 40 MIN: CPT

## 2021-12-16 ENCOUNTER — APPOINTMENT (OUTPATIENT)
Dept: INTERNAL MEDICINE | Facility: CLINIC | Age: 86
End: 2021-12-16
Payer: MEDICARE

## 2021-12-16 VITALS
BODY MASS INDEX: 26.05 KG/M2 | SYSTOLIC BLOOD PRESSURE: 128 MMHG | OXYGEN SATURATION: 98 % | WEIGHT: 147 LBS | HEIGHT: 63 IN | TEMPERATURE: 97.7 F | DIASTOLIC BLOOD PRESSURE: 72 MMHG | HEART RATE: 115 BPM

## 2021-12-16 DIAGNOSIS — D64.9 ANEMIA, UNSPECIFIED: ICD-10-CM

## 2021-12-16 LAB
INR PPP: 3.1 RATIO
QUALITY CONTROL: YES

## 2021-12-16 PROCEDURE — 99214 OFFICE O/P EST MOD 30 MIN: CPT | Mod: 25

## 2021-12-16 PROCEDURE — 36415 COLL VENOUS BLD VENIPUNCTURE: CPT

## 2021-12-16 NOTE — HEALTH RISK ASSESSMENT
[0] : 2) Feeling down, depressed, or hopeless: Not at all (0) [PHQ-2 Negative - No further assessment needed] : PHQ-2 Negative - No further assessment needed [Former] : Former [[id number]] : 0

## 2021-12-16 NOTE — HISTORY OF PRESENT ILLNESS
[FreeTextEntry1] : f/u lipids, thyroid, blood sugar [de-identified] : 89-year-old female with history of atrial fibrillation on Coumadin and hypothyroidism who is here for follow-up. last visit her dose of synthroid was increased to 88mcg.  no palpitations, jitteriness, increased fatigue, change in weight or BM\par She is under a lot of stress. her 59 year old daughter ... due to complications form smoking. She has good friends who she spends time with. She goes to a senior center. Her other children will be coming to spend [place] with her.\par Her most recent INR was high. Her dose of coumadin was adjusted and her INR will be rechecked next week\par no bleeding\par No chest pain, palpitations or shortness of breath\par She had both doses of the Pfizer COVID vaccine and the booster\par

## 2021-12-16 NOTE — PLAN
[FreeTextEntry1] : BP is stable\par Continue metoprolol, digoxin and coumadin for A. fib\par check CBC, CMP, TSH. will check lipids next visit\par Refill synthroid as labs dictate\par immunizations UTD\par f/u 3 months

## 2021-12-17 ENCOUNTER — NON-APPOINTMENT (OUTPATIENT)
Age: 86
End: 2021-12-17

## 2021-12-17 LAB
ALBUMIN SERPL ELPH-MCNC: 4.3 G/DL
ALP BLD-CCNC: 72 U/L
ALT SERPL-CCNC: 10 U/L
ANION GAP SERPL CALC-SCNC: 12 MMOL/L
AST SERPL-CCNC: 17 U/L
BASOPHILS # BLD AUTO: 0 K/UL
BASOPHILS NFR BLD AUTO: 0 %
BILIRUB SERPL-MCNC: 0.5 MG/DL
BUN SERPL-MCNC: 14 MG/DL
CALCIUM SERPL-MCNC: 9.4 MG/DL
CHLORIDE SERPL-SCNC: 107 MMOL/L
CHOLEST SERPL-MCNC: 151 MG/DL
CO2 SERPL-SCNC: 23 MMOL/L
CREAT SERPL-MCNC: 0.64 MG/DL
EOSINOPHIL # BLD AUTO: 0.14 K/UL
EOSINOPHIL NFR BLD AUTO: 2.4 %
ESTIMATED AVERAGE GLUCOSE: 126 MG/DL
GLUCOSE SERPL-MCNC: 115 MG/DL
HBA1C MFR BLD HPLC: 6 %
HCT VFR BLD CALC: 36.3 %
HDLC SERPL-MCNC: 48 MG/DL
HGB BLD-MCNC: 11.6 G/DL
IMM GRANULOCYTES NFR BLD AUTO: 0.2 %
LDLC SERPL CALC-MCNC: 85 MG/DL
LYMPHOCYTES # BLD AUTO: 1.04 K/UL
LYMPHOCYTES NFR BLD AUTO: 18.1 %
MAN DIFF?: NORMAL
MCHC RBC-ENTMCNC: 30.1 PG
MCHC RBC-ENTMCNC: 32 GM/DL
MCV RBC AUTO: 94 FL
MONOCYTES # BLD AUTO: 0.43 K/UL
MONOCYTES NFR BLD AUTO: 7.5 %
NEUTROPHILS # BLD AUTO: 4.12 K/UL
NEUTROPHILS NFR BLD AUTO: 71.8 %
NONHDLC SERPL-MCNC: 103 MG/DL
PLATELET # BLD AUTO: 126 K/UL
POTASSIUM SERPL-SCNC: 4.2 MMOL/L
PROT SERPL-MCNC: 6.3 G/DL
RBC # BLD: 3.86 M/UL
RBC # FLD: 13.2 %
SODIUM SERPL-SCNC: 142 MMOL/L
TRIGL SERPL-MCNC: 90 MG/DL
TSH SERPL-ACNC: 1.17 UIU/ML
WBC # FLD AUTO: 5.74 K/UL

## 2021-12-21 ENCOUNTER — LABORATORY RESULT (OUTPATIENT)
Age: 86
End: 2021-12-21

## 2021-12-22 LAB — INR PPP: 5.13

## 2021-12-27 ENCOUNTER — LABORATORY RESULT (OUTPATIENT)
Age: 86
End: 2021-12-27

## 2021-12-28 LAB — INR PPP: 1.94

## 2022-01-03 ENCOUNTER — RX RENEWAL (OUTPATIENT)
Age: 87
End: 2022-01-03

## 2022-01-03 ENCOUNTER — LABORATORY RESULT (OUTPATIENT)
Age: 87
End: 2022-01-03

## 2022-01-05 LAB — INR PPP: 2.39

## 2022-01-25 ENCOUNTER — LABORATORY RESULT (OUTPATIENT)
Age: 87
End: 2022-01-25

## 2022-01-26 LAB — INR PPP: 2.46

## 2022-02-03 ENCOUNTER — LABORATORY RESULT (OUTPATIENT)
Age: 87
End: 2022-02-03

## 2022-02-04 LAB — INR PPP: 1.67

## 2022-02-11 ENCOUNTER — LABORATORY RESULT (OUTPATIENT)
Age: 87
End: 2022-02-11

## 2022-02-14 LAB — INR PPP: 2.21

## 2022-02-25 ENCOUNTER — LABORATORY RESULT (OUTPATIENT)
Age: 87
End: 2022-02-25

## 2022-02-28 LAB — INR PPP: 2.14

## 2022-03-16 ENCOUNTER — LABORATORY RESULT (OUTPATIENT)
Age: 87
End: 2022-03-16

## 2022-03-17 LAB — INR PPP: 1.64

## 2022-03-23 ENCOUNTER — APPOINTMENT (OUTPATIENT)
Dept: INTERNAL MEDICINE | Facility: CLINIC | Age: 87
End: 2022-03-23
Payer: MEDICARE

## 2022-03-23 VITALS
BODY MASS INDEX: 25.87 KG/M2 | OXYGEN SATURATION: 98 % | HEIGHT: 63 IN | WEIGHT: 146 LBS | HEART RATE: 101 BPM | TEMPERATURE: 97.8 F | DIASTOLIC BLOOD PRESSURE: 70 MMHG | RESPIRATION RATE: 18 BRPM | SYSTOLIC BLOOD PRESSURE: 138 MMHG

## 2022-03-23 PROCEDURE — 99214 OFFICE O/P EST MOD 30 MIN: CPT | Mod: 25

## 2022-03-23 PROCEDURE — 36415 COLL VENOUS BLD VENIPUNCTURE: CPT

## 2022-03-23 NOTE — HEALTH RISK ASSESSMENT
[Former] : Former [0] : 2) Feeling down, depressed, or hopeless: Not at all (0) [PHQ-2 Negative - No further assessment needed] : PHQ-2 Negative - No further assessment needed [[id number]] : 0

## 2022-03-23 NOTE — HISTORY OF PRESENT ILLNESS
[FreeTextEntry1] : f/u lipids, thyroid, blood sugar [de-identified] : 89-year-old female with history of atrial fibrillation on Coumadin and hypothyroidism who is here for follow-up. She is on synthroid 88mcg.  no palpitations, jitteriness, increased fatigue, change in weight or BM\par Her most recent INR was low. She will have it rechecked this week. Her dose of coumadin is 2mg \par no bleeding\par No chest pain, palpitations or shortness of breath\par She line dances a few times per week. Goes to an exercise class. will walk more now that the weather is better\par \par

## 2022-03-24 ENCOUNTER — LABORATORY RESULT (OUTPATIENT)
Age: 87
End: 2022-03-24

## 2022-03-25 LAB — INR PPP: 1.88

## 2022-03-28 LAB
ALBUMIN SERPL ELPH-MCNC: 4.5 G/DL
ALP BLD-CCNC: 71 U/L
ALT SERPL-CCNC: 13 U/L
ANION GAP SERPL CALC-SCNC: 12 MMOL/L
AST SERPL-CCNC: 18 U/L
BASOPHILS # BLD AUTO: 0 K/UL
BASOPHILS NFR BLD AUTO: 0 %
BILIRUB SERPL-MCNC: 0.6 MG/DL
BUN SERPL-MCNC: 16 MG/DL
CALCIUM SERPL-MCNC: 9.1 MG/DL
CHLORIDE SERPL-SCNC: 108 MMOL/L
CO2 SERPL-SCNC: 23 MMOL/L
CREAT SERPL-MCNC: 0.62 MG/DL
EGFR: 84 ML/MIN/1.73M2
EOSINOPHIL # BLD AUTO: 0 K/UL
EOSINOPHIL NFR BLD AUTO: 0 %
ESTIMATED AVERAGE GLUCOSE: 128 MG/DL
GLUCOSE SERPL-MCNC: 112 MG/DL
HBA1C MFR BLD HPLC: 6.1 %
HCT VFR BLD CALC: 42.2 %
HGB BLD-MCNC: 13 G/DL
IMM GRANULOCYTES NFR BLD AUTO: 0.5 %
LYMPHOCYTES # BLD AUTO: 1.12 K/UL
LYMPHOCYTES NFR BLD AUTO: 17.5 %
MAN DIFF?: NORMAL
MCHC RBC-ENTMCNC: 28.3 PG
MCHC RBC-ENTMCNC: 30.8 GM/DL
MCV RBC AUTO: 91.9 FL
MONOCYTES # BLD AUTO: 0.53 K/UL
MONOCYTES NFR BLD AUTO: 8.3 %
NEUTROPHILS # BLD AUTO: 4.72 K/UL
NEUTROPHILS NFR BLD AUTO: 73.7 %
PLATELET # BLD AUTO: 107 K/UL
POTASSIUM SERPL-SCNC: 4.6 MMOL/L
PROT SERPL-MCNC: 6.6 G/DL
RBC # BLD: 4.59 M/UL
RBC # FLD: 14 %
SODIUM SERPL-SCNC: 143 MMOL/L
TSH SERPL-ACNC: 2.65 UIU/ML
WBC # FLD AUTO: 6.4 K/UL

## 2022-03-30 ENCOUNTER — NON-APPOINTMENT (OUTPATIENT)
Age: 87
End: 2022-03-30

## 2022-04-07 ENCOUNTER — LABORATORY RESULT (OUTPATIENT)
Age: 87
End: 2022-04-07

## 2022-04-07 LAB — INR PPP: 1.94

## 2022-04-21 ENCOUNTER — LABORATORY RESULT (OUTPATIENT)
Age: 87
End: 2022-04-21

## 2022-04-22 LAB — INR PPP: 2.15

## 2022-05-10 ENCOUNTER — LABORATORY RESULT (OUTPATIENT)
Age: 87
End: 2022-05-10

## 2022-05-10 ENCOUNTER — NON-APPOINTMENT (OUTPATIENT)
Age: 87
End: 2022-05-10

## 2022-05-11 LAB — INR PPP: 2.15

## 2022-06-07 ENCOUNTER — NON-APPOINTMENT (OUTPATIENT)
Age: 87
End: 2022-06-07

## 2022-06-07 ENCOUNTER — LABORATORY RESULT (OUTPATIENT)
Age: 87
End: 2022-06-07

## 2022-06-08 LAB — INR PPP: 2.01

## 2022-06-22 ENCOUNTER — LABORATORY RESULT (OUTPATIENT)
Age: 87
End: 2022-06-22

## 2022-06-22 ENCOUNTER — APPOINTMENT (OUTPATIENT)
Dept: INTERNAL MEDICINE | Facility: CLINIC | Age: 87
End: 2022-06-22
Payer: MEDICARE

## 2022-06-22 VITALS
TEMPERATURE: 98.1 F | HEIGHT: 63 IN | SYSTOLIC BLOOD PRESSURE: 130 MMHG | BODY MASS INDEX: 25.34 KG/M2 | WEIGHT: 143 LBS | DIASTOLIC BLOOD PRESSURE: 70 MMHG

## 2022-06-22 PROCEDURE — 99214 OFFICE O/P EST MOD 30 MIN: CPT | Mod: 25

## 2022-06-22 PROCEDURE — 36415 COLL VENOUS BLD VENIPUNCTURE: CPT

## 2022-06-22 RX ORDER — AMOXICILLIN 875 MG/1
875 TABLET, FILM COATED ORAL
Qty: 14 | Refills: 0 | Status: COMPLETED | COMMUNITY
Start: 2022-04-22

## 2022-06-22 RX ORDER — AZELASTINE HYDROCHLORIDE 137 UG/1
0.1 SPRAY, METERED NASAL
Qty: 30 | Refills: 0 | Status: COMPLETED | COMMUNITY
Start: 2022-04-22

## 2022-06-22 RX ORDER — AMOXICILLIN 500 MG/1
500 CAPSULE ORAL
Qty: 28 | Refills: 0 | Status: COMPLETED | COMMUNITY
Start: 2022-01-27

## 2022-06-22 NOTE — PLAN
[FreeTextEntry1] : Depression screening negative \par BP is stable\par Continue metoprolol, digoxin and coumadin for A. fib\par check CBC, CMP, TSH, lipids\par Refill synthroid as labs dictate\par immunizations UTD\par Given prescription for mammogram\par f/u 3 months

## 2022-06-22 NOTE — HEALTH RISK ASSESSMENT
[Former] : Former [0] : 2) Feeling down, depressed, or hopeless: Not at all (0) [PHQ-2 Negative - No further assessment needed] : PHQ-2 Negative - No further assessment needed [[id number]] : 0

## 2022-06-22 NOTE — HISTORY OF PRESENT ILLNESS
[FreeTextEntry1] : f/u lipids, thyroid, blood sugar [de-identified] : 89-year-old female with history of atrial fibrillation on Coumadin and hypothyroidism who is here for follow-up. She is on synthroid 88mcg.  no palpitations, jitteriness, increased fatigue, change in weight or BM\par He is on coumadin 2mg. no bleeding\par No chest pain, palpitations or shortness of breath\par She line dances a few times per week. Goes to an exercise class.  Walks\par \par

## 2022-06-23 ENCOUNTER — NON-APPOINTMENT (OUTPATIENT)
Age: 87
End: 2022-06-23

## 2022-06-23 ENCOUNTER — RX RENEWAL (OUTPATIENT)
Age: 87
End: 2022-06-23

## 2022-06-23 LAB
ALBUMIN SERPL ELPH-MCNC: 4.5 G/DL
ALP BLD-CCNC: 74 U/L
ALT SERPL-CCNC: 13 U/L
ANION GAP SERPL CALC-SCNC: 10 MMOL/L
AST SERPL-CCNC: 20 U/L
BASOPHILS # BLD AUTO: 0.01 K/UL
BASOPHILS NFR BLD AUTO: 0.2 %
BILIRUB SERPL-MCNC: 0.6 MG/DL
BUN SERPL-MCNC: 14 MG/DL
CALCIUM SERPL-MCNC: 9.5 MG/DL
CHLORIDE SERPL-SCNC: 108 MMOL/L
CHOLEST SERPL-MCNC: 162 MG/DL
CO2 SERPL-SCNC: 26 MMOL/L
CREAT SERPL-MCNC: 0.69 MG/DL
EGFR: 82 ML/MIN/1.73M2
EOSINOPHIL # BLD AUTO: 0 K/UL
EOSINOPHIL NFR BLD AUTO: 0 %
GLUCOSE SERPL-MCNC: 99 MG/DL
HCT VFR BLD CALC: 40.9 %
HDLC SERPL-MCNC: 52 MG/DL
HGB BLD-MCNC: 12.5 G/DL
IMM GRANULOCYTES NFR BLD AUTO: 0.4 %
LDLC SERPL CALC-MCNC: 88 MG/DL
LYMPHOCYTES # BLD AUTO: 0.97 K/UL
LYMPHOCYTES NFR BLD AUTO: 18.8 %
MAN DIFF?: NORMAL
MCHC RBC-ENTMCNC: 28.9 PG
MCHC RBC-ENTMCNC: 30.6 GM/DL
MCV RBC AUTO: 94.7 FL
MONOCYTES # BLD AUTO: 0.39 K/UL
MONOCYTES NFR BLD AUTO: 7.6 %
NEUTROPHILS # BLD AUTO: 3.76 K/UL
NEUTROPHILS NFR BLD AUTO: 73 %
NONHDLC SERPL-MCNC: 111 MG/DL
PLATELET # BLD AUTO: 101 K/UL
POTASSIUM SERPL-SCNC: 4.8 MMOL/L
PROT SERPL-MCNC: 6.6 G/DL
RBC # BLD: 4.32 M/UL
RBC # FLD: 14.2 %
SODIUM SERPL-SCNC: 144 MMOL/L
TRIGL SERPL-MCNC: 111 MG/DL
TSH SERPL-ACNC: 5.83 UIU/ML
WBC # FLD AUTO: 5.15 K/UL

## 2022-07-05 ENCOUNTER — RX RENEWAL (OUTPATIENT)
Age: 87
End: 2022-07-05

## 2022-07-05 ENCOUNTER — NON-APPOINTMENT (OUTPATIENT)
Age: 87
End: 2022-07-05

## 2022-07-05 ENCOUNTER — LABORATORY RESULT (OUTPATIENT)
Age: 87
End: 2022-07-05

## 2022-07-06 LAB — INR PPP: 1.89

## 2022-07-18 ENCOUNTER — RX RENEWAL (OUTPATIENT)
Age: 87
End: 2022-07-18

## 2022-07-26 ENCOUNTER — LABORATORY RESULT (OUTPATIENT)
Age: 87
End: 2022-07-26

## 2022-07-26 LAB — INR PPP: 2.66

## 2022-08-16 ENCOUNTER — LABORATORY RESULT (OUTPATIENT)
Age: 87
End: 2022-08-16

## 2022-08-16 ENCOUNTER — NON-APPOINTMENT (OUTPATIENT)
Age: 87
End: 2022-08-16

## 2022-08-26 ENCOUNTER — NON-APPOINTMENT (OUTPATIENT)
Age: 87
End: 2022-08-26

## 2022-09-15 ENCOUNTER — NON-APPOINTMENT (OUTPATIENT)
Age: 87
End: 2022-09-15

## 2022-09-15 ENCOUNTER — LABORATORY RESULT (OUTPATIENT)
Age: 87
End: 2022-09-15

## 2022-09-16 ENCOUNTER — NON-APPOINTMENT (OUTPATIENT)
Age: 87
End: 2022-09-16

## 2022-09-22 ENCOUNTER — LABORATORY RESULT (OUTPATIENT)
Age: 87
End: 2022-09-22

## 2022-09-23 ENCOUNTER — NON-APPOINTMENT (OUTPATIENT)
Age: 87
End: 2022-09-23

## 2022-09-28 ENCOUNTER — LABORATORY RESULT (OUTPATIENT)
Age: 87
End: 2022-09-28

## 2022-09-28 ENCOUNTER — APPOINTMENT (OUTPATIENT)
Dept: INTERNAL MEDICINE | Facility: CLINIC | Age: 87
End: 2022-09-28

## 2022-09-28 VITALS
DIASTOLIC BLOOD PRESSURE: 70 MMHG | TEMPERATURE: 97.8 F | WEIGHT: 141 LBS | SYSTOLIC BLOOD PRESSURE: 102 MMHG | HEART RATE: 68 BPM | BODY MASS INDEX: 24.98 KG/M2 | HEIGHT: 63 IN | RESPIRATION RATE: 17 BRPM | OXYGEN SATURATION: 98 %

## 2022-09-28 PROCEDURE — 99214 OFFICE O/P EST MOD 30 MIN: CPT | Mod: 25

## 2022-09-28 PROCEDURE — 36415 COLL VENOUS BLD VENIPUNCTURE: CPT

## 2022-09-28 NOTE — HEALTH RISK ASSESSMENT
[Former] : Former [0] : 2) Feeling down, depressed, or hopeless: Not at all (0) [PHQ-2 Negative - No further assessment needed] : PHQ-2 Negative - No further assessment needed [[id number]] : 0

## 2022-09-28 NOTE — HISTORY OF PRESENT ILLNESS
[FreeTextEntry1] : f/u lipids, thyroid, blood sugar [de-identified] : 89-year-old female with history of atrial fibrillation on Coumadin and hypothyroidism who is here for follow-up. \par She went to an allergist for hives but they had improved by the time she saw him. He recommended loratidine bid. It is improved but sometimes still gets a few hives. SHe was tested years ago but no cause was found.\par She is on synthroid 88mcg.  no palpitations, jitteriness, increased fatigue, change in weight or BM. Last visit her TSH was high but the dose was not changed as she was asymptomatic.\par She is on coumadin 2mg. no bleeding\par No chest pain, palpitations or shortness of breath\par She line dances a few times per week. Goes to an exercise class.  Walks\par \par

## 2022-09-28 NOTE — PLAN
[FreeTextEntry1] : Depression screening negative \par BP is stable\par Continue metoprolol, digoxin and coumadin for A. fib\par check CBC, CMP, TSH, lipids\par Refill synthroid as labs dictate\par immunizations UTD\par f/u 3 months

## 2022-09-29 ENCOUNTER — NON-APPOINTMENT (OUTPATIENT)
Age: 87
End: 2022-09-29

## 2022-10-03 ENCOUNTER — NON-APPOINTMENT (OUTPATIENT)
Age: 87
End: 2022-10-03

## 2022-10-03 ENCOUNTER — LABORATORY RESULT (OUTPATIENT)
Age: 87
End: 2022-10-03

## 2022-10-03 LAB
ALBUMIN SERPL ELPH-MCNC: 4.6 G/DL
ALP BLD-CCNC: 82 U/L
ALT SERPL-CCNC: 8 U/L
ANION GAP SERPL CALC-SCNC: 12 MMOL/L
AST SERPL-CCNC: 18 U/L
BASOPHILS # BLD AUTO: 0.01 K/UL
BASOPHILS NFR BLD AUTO: 0.2 %
BILIRUB SERPL-MCNC: 0.6 MG/DL
BUN SERPL-MCNC: 16 MG/DL
CALCIUM SERPL-MCNC: 9.7 MG/DL
CHLORIDE SERPL-SCNC: 107 MMOL/L
CHOLEST SERPL-MCNC: 188 MG/DL
CO2 SERPL-SCNC: 25 MMOL/L
CREAT SERPL-MCNC: 0.66 MG/DL
EGFR: 83 ML/MIN/1.73M2
EOSINOPHIL # BLD AUTO: 0 K/UL
EOSINOPHIL NFR BLD AUTO: 0 %
ESTIMATED AVERAGE GLUCOSE: 126 MG/DL
GLUCOSE SERPL-MCNC: 105 MG/DL
HBA1C MFR BLD HPLC: 6 %
HCT VFR BLD CALC: 42 %
HDLC SERPL-MCNC: 58 MG/DL
HGB BLD-MCNC: 13 G/DL
IMM GRANULOCYTES NFR BLD AUTO: 0.2 %
LDLC SERPL CALC-MCNC: 106 MG/DL
LYMPHOCYTES # BLD AUTO: 1.08 K/UL
LYMPHOCYTES NFR BLD AUTO: 17.3 %
MAN DIFF?: NORMAL
MCHC RBC-ENTMCNC: 29.4 PG
MCHC RBC-ENTMCNC: 31 GM/DL
MCV RBC AUTO: 95 FL
MONOCYTES # BLD AUTO: 0.43 K/UL
MONOCYTES NFR BLD AUTO: 6.9 %
NEUTROPHILS # BLD AUTO: 4.72 K/UL
NEUTROPHILS NFR BLD AUTO: 75.4 %
NONHDLC SERPL-MCNC: 129 MG/DL
PLATELET # BLD AUTO: 120 K/UL
POTASSIUM SERPL-SCNC: 4.4 MMOL/L
PROT SERPL-MCNC: 6.8 G/DL
RBC # BLD: 4.42 M/UL
RBC # FLD: 14.6 %
SODIUM SERPL-SCNC: 144 MMOL/L
TRIGL SERPL-MCNC: 118 MG/DL
TSH SERPL-ACNC: 10.6 UIU/ML
WBC # FLD AUTO: 6.25 K/UL

## 2022-10-10 ENCOUNTER — NON-APPOINTMENT (OUTPATIENT)
Age: 87
End: 2022-10-10

## 2022-10-10 ENCOUNTER — LABORATORY RESULT (OUTPATIENT)
Age: 87
End: 2022-10-10

## 2022-10-24 ENCOUNTER — LABORATORY RESULT (OUTPATIENT)
Age: 87
End: 2022-10-24

## 2022-10-25 ENCOUNTER — NON-APPOINTMENT (OUTPATIENT)
Age: 87
End: 2022-10-25

## 2022-11-02 ENCOUNTER — APPOINTMENT (OUTPATIENT)
Dept: CARDIOLOGY | Facility: CLINIC | Age: 87
End: 2022-11-02

## 2022-11-02 ENCOUNTER — NON-APPOINTMENT (OUTPATIENT)
Age: 87
End: 2022-11-02

## 2022-11-02 VITALS
BODY MASS INDEX: 25.87 KG/M2 | OXYGEN SATURATION: 98 % | SYSTOLIC BLOOD PRESSURE: 133 MMHG | DIASTOLIC BLOOD PRESSURE: 77 MMHG | HEART RATE: 74 BPM | WEIGHT: 146 LBS | HEIGHT: 63 IN

## 2022-11-02 PROCEDURE — 99215 OFFICE O/P EST HI 40 MIN: CPT

## 2022-11-02 PROCEDURE — 93000 ELECTROCARDIOGRAM COMPLETE: CPT

## 2022-11-14 ENCOUNTER — NON-APPOINTMENT (OUTPATIENT)
Age: 87
End: 2022-11-14

## 2022-11-14 ENCOUNTER — LABORATORY RESULT (OUTPATIENT)
Age: 87
End: 2022-11-14

## 2022-12-12 ENCOUNTER — NON-APPOINTMENT (OUTPATIENT)
Age: 87
End: 2022-12-12

## 2023-01-04 ENCOUNTER — RX RENEWAL (OUTPATIENT)
Age: 88
End: 2023-01-04

## 2023-01-05 ENCOUNTER — APPOINTMENT (OUTPATIENT)
Dept: INTERNAL MEDICINE | Facility: CLINIC | Age: 88
End: 2023-01-05
Payer: MEDICARE

## 2023-01-05 VITALS
HEIGHT: 63 IN | BODY MASS INDEX: 25.69 KG/M2 | TEMPERATURE: 98.2 F | DIASTOLIC BLOOD PRESSURE: 70 MMHG | HEART RATE: 73 BPM | OXYGEN SATURATION: 98 % | WEIGHT: 145 LBS | SYSTOLIC BLOOD PRESSURE: 110 MMHG

## 2023-01-05 PROCEDURE — 99214 OFFICE O/P EST MOD 30 MIN: CPT | Mod: 25

## 2023-01-05 PROCEDURE — 36415 COLL VENOUS BLD VENIPUNCTURE: CPT

## 2023-01-05 NOTE — HEALTH RISK ASSESSMENT
[Former] : Former [0] : 2) Feeling down, depressed, or hopeless: Not at all (0) [PHQ-2 Negative - No further assessment needed] : PHQ-2 Negative - No further assessment needed [[id number]] : 0

## 2023-01-05 NOTE — PLAN
[FreeTextEntry1] : Depression screening negative \par BP is stable\par Continue metoprolol, digoxin and coumadin for A. fib\par check CBC, CMP, TSH\par Refill synthroid as labs dictate\par immunizations UTD\par f/u 3 months

## 2023-01-05 NOTE — HISTORY OF PRESENT ILLNESS
[FreeTextEntry1] : f/u lipids, thyroid, blood sugar [de-identified] : 89-year-old female with history of atrial fibrillation on Coumadin and hypothyroidism who is here for follow-up. \par Last visit her dose of levothyroxine was increased to 100 mcg.  She is taking it as instructed.  no palpitations, jitteriness, increased fatigue, change in weight or BM.\par She is on coumadin 2mg. no bleeding\par No chest pain, palpitations or shortness of breath\par She line dances a few times per week. Goes to an exercise class.  Walks\par \par

## 2023-01-09 ENCOUNTER — NON-APPOINTMENT (OUTPATIENT)
Age: 88
End: 2023-01-09

## 2023-01-10 LAB — INR PPP: 2.77

## 2023-01-12 LAB
ALBUMIN SERPL ELPH-MCNC: 4.3 G/DL
ALP BLD-CCNC: 68 U/L
ALT SERPL-CCNC: 7 U/L
ANION GAP SERPL CALC-SCNC: 12 MMOL/L
AST SERPL-CCNC: 16 U/L
BASOPHILS # BLD AUTO: 0.01 K/UL
BASOPHILS NFR BLD AUTO: 0.2 %
BILIRUB SERPL-MCNC: 0.6 MG/DL
BUN SERPL-MCNC: 15 MG/DL
CALCIUM SERPL-MCNC: 9.4 MG/DL
CHLORIDE SERPL-SCNC: 107 MMOL/L
CO2 SERPL-SCNC: 24 MMOL/L
CREAT SERPL-MCNC: 0.62 MG/DL
EGFR: 84 ML/MIN/1.73M2
EOSINOPHIL # BLD AUTO: 0 K/UL
EOSINOPHIL NFR BLD AUTO: 0 %
ESTIMATED AVERAGE GLUCOSE: 126 MG/DL
GLUCOSE SERPL-MCNC: 128 MG/DL
HBA1C MFR BLD HPLC: 6 %
HCT VFR BLD CALC: 40.6 %
HGB BLD-MCNC: 12.1 G/DL
IMM GRANULOCYTES NFR BLD AUTO: 0.2 %
LYMPHOCYTES # BLD AUTO: 0.84 K/UL
LYMPHOCYTES NFR BLD AUTO: 18.6 %
MAN DIFF?: NORMAL
MCHC RBC-ENTMCNC: 28.3 PG
MCHC RBC-ENTMCNC: 29.8 GM/DL
MCV RBC AUTO: 94.9 FL
MONOCYTES # BLD AUTO: 0.37 K/UL
MONOCYTES NFR BLD AUTO: 8.2 %
NEUTROPHILS # BLD AUTO: 3.28 K/UL
NEUTROPHILS NFR BLD AUTO: 72.8 %
PLATELET # BLD AUTO: 123 K/UL
POTASSIUM SERPL-SCNC: 4.2 MMOL/L
PROT SERPL-MCNC: 6.6 G/DL
RBC # BLD: 4.28 M/UL
RBC # FLD: 13.5 %
SODIUM SERPL-SCNC: 144 MMOL/L
TSH SERPL-ACNC: 2.95 UIU/ML
WBC # FLD AUTO: 4.51 K/UL

## 2023-02-07 ENCOUNTER — NON-APPOINTMENT (OUTPATIENT)
Age: 88
End: 2023-02-07

## 2023-02-08 LAB — INR PPP: 2.23

## 2023-03-13 ENCOUNTER — LABORATORY RESULT (OUTPATIENT)
Age: 88
End: 2023-03-13

## 2023-03-13 ENCOUNTER — NON-APPOINTMENT (OUTPATIENT)
Age: 88
End: 2023-03-13

## 2023-03-20 ENCOUNTER — LABORATORY RESULT (OUTPATIENT)
Age: 88
End: 2023-03-20

## 2023-03-22 ENCOUNTER — NON-APPOINTMENT (OUTPATIENT)
Age: 88
End: 2023-03-22

## 2023-04-03 ENCOUNTER — LABORATORY RESULT (OUTPATIENT)
Age: 88
End: 2023-04-03

## 2023-04-03 ENCOUNTER — RX RENEWAL (OUTPATIENT)
Age: 88
End: 2023-04-03

## 2023-04-04 ENCOUNTER — NON-APPOINTMENT (OUTPATIENT)
Age: 88
End: 2023-04-04

## 2023-04-05 ENCOUNTER — NON-APPOINTMENT (OUTPATIENT)
Age: 88
End: 2023-04-05

## 2023-04-10 ENCOUNTER — LABORATORY RESULT (OUTPATIENT)
Age: 88
End: 2023-04-10

## 2023-04-10 ENCOUNTER — NON-APPOINTMENT (OUTPATIENT)
Age: 88
End: 2023-04-10

## 2023-04-16 ENCOUNTER — LABORATORY RESULT (OUTPATIENT)
Age: 88
End: 2023-04-16

## 2023-05-01 ENCOUNTER — NON-APPOINTMENT (OUTPATIENT)
Age: 88
End: 2023-05-01

## 2023-05-01 ENCOUNTER — LABORATORY RESULT (OUTPATIENT)
Age: 88
End: 2023-05-01

## 2023-05-02 ENCOUNTER — NON-APPOINTMENT (OUTPATIENT)
Age: 88
End: 2023-05-02

## 2023-05-03 ENCOUNTER — APPOINTMENT (OUTPATIENT)
Dept: INTERNAL MEDICINE | Facility: CLINIC | Age: 88
End: 2023-05-03
Payer: MEDICARE

## 2023-05-03 ENCOUNTER — LABORATORY RESULT (OUTPATIENT)
Age: 88
End: 2023-05-03

## 2023-05-03 VITALS
SYSTOLIC BLOOD PRESSURE: 116 MMHG | RESPIRATION RATE: 17 BRPM | DIASTOLIC BLOOD PRESSURE: 70 MMHG | OXYGEN SATURATION: 97 % | TEMPERATURE: 97.4 F | WEIGHT: 148 LBS | BODY MASS INDEX: 26.22 KG/M2 | HEIGHT: 63 IN | HEART RATE: 70 BPM

## 2023-05-03 PROCEDURE — 99214 OFFICE O/P EST MOD 30 MIN: CPT | Mod: 25

## 2023-05-03 PROCEDURE — 36415 COLL VENOUS BLD VENIPUNCTURE: CPT

## 2023-05-03 NOTE — PLAN
[FreeTextEntry1] : Hypothyroidism\par Check thyroid function tests.  Continue levothyroxine 100 mcg daily.  Adjust dose if needed.\par \par Atrial fibrillation\par She is stable on metoprolol and digoxin\par Continue Coumadin per cardiology.  She an appointment scheduled with cardiology soon.  Will check a lipid panel so it is available at the time of her appointment.\par \par Health maintenance\par Depression screening negative \par immunizations UTD\par f/u 3 months

## 2023-05-03 NOTE — HEALTH RISK ASSESSMENT
[0] : 2) Feeling down, depressed, or hopeless: Not at all (0) [PHQ-2 Negative - No further assessment needed] : PHQ-2 Negative - No further assessment needed [[id number]] : 0 [Former] : Former [> 15 Years] : > 15 Years

## 2023-05-03 NOTE — HISTORY OF PRESENT ILLNESS
[FreeTextEntry1] : f/u lipids, thyroid, blood sugar [de-identified] : 89-year-old female with history of atrial fibrillation on Coumadin and hypothyroidism who is here for follow-up. \par   no palpitations, jitteriness, increased fatigue, change in weight or BM.\par She is on coumadin 2mg. no bleeding\par No chest pain, palpitations or shortness of breath\par She line dances and walks but is slowing down some and is doing less of it.  \par \par

## 2023-05-09 ENCOUNTER — LABORATORY RESULT (OUTPATIENT)
Age: 88
End: 2023-05-09

## 2023-05-10 ENCOUNTER — NON-APPOINTMENT (OUTPATIENT)
Age: 88
End: 2023-05-10

## 2023-05-10 LAB
ALBUMIN SERPL ELPH-MCNC: 4.5 G/DL
ALP BLD-CCNC: 70 U/L
ALT SERPL-CCNC: 11 U/L
ANION GAP SERPL CALC-SCNC: 14 MMOL/L
AST SERPL-CCNC: 22 U/L
BASOPHILS # BLD AUTO: 0 K/UL
BASOPHILS NFR BLD AUTO: 0 %
BILIRUB SERPL-MCNC: 0.7 MG/DL
BUN SERPL-MCNC: 18 MG/DL
CALCIUM SERPL-MCNC: 9.7 MG/DL
CHLORIDE SERPL-SCNC: 108 MMOL/L
CHOLEST SERPL-MCNC: 171 MG/DL
CO2 SERPL-SCNC: 23 MMOL/L
CREAT SERPL-MCNC: 0.62 MG/DL
EGFR: 84 ML/MIN/1.73M2
EOSINOPHIL # BLD AUTO: 0 K/UL
EOSINOPHIL NFR BLD AUTO: 0 %
ESTIMATED AVERAGE GLUCOSE: 120 MG/DL
GLUCOSE SERPL-MCNC: 109 MG/DL
HBA1C MFR BLD HPLC: 5.8 %
HCT VFR BLD CALC: 40.9 %
HDLC SERPL-MCNC: 64 MG/DL
HGB BLD-MCNC: 12.8 G/DL
IMM GRANULOCYTES NFR BLD AUTO: 0.4 %
LDLC SERPL CALC-MCNC: 95 MG/DL
LYMPHOCYTES # BLD AUTO: 1.08 K/UL
LYMPHOCYTES NFR BLD AUTO: 19.9 %
MAN DIFF?: NORMAL
MCHC RBC-ENTMCNC: 28.8 PG
MCHC RBC-ENTMCNC: 31.3 GM/DL
MCV RBC AUTO: 91.9 FL
MONOCYTES # BLD AUTO: 0.39 K/UL
MONOCYTES NFR BLD AUTO: 7.2 %
NEUTROPHILS # BLD AUTO: 3.95 K/UL
NEUTROPHILS NFR BLD AUTO: 72.5 %
NONHDLC SERPL-MCNC: 107 MG/DL
PLATELET # BLD AUTO: 120 K/UL
POTASSIUM SERPL-SCNC: 4.4 MMOL/L
PROT SERPL-MCNC: 6.7 G/DL
RBC # BLD: 4.45 M/UL
RBC # FLD: 14.3 %
SODIUM SERPL-SCNC: 145 MMOL/L
TRIGL SERPL-MCNC: 61 MG/DL
TSH SERPL-ACNC: 4.7 UIU/ML
WBC # FLD AUTO: 5.44 K/UL

## 2023-05-11 ENCOUNTER — NON-APPOINTMENT (OUTPATIENT)
Age: 88
End: 2023-05-11

## 2023-05-16 ENCOUNTER — LABORATORY RESULT (OUTPATIENT)
Age: 88
End: 2023-05-16

## 2023-05-17 ENCOUNTER — NON-APPOINTMENT (OUTPATIENT)
Age: 88
End: 2023-05-17

## 2023-05-23 ENCOUNTER — NON-APPOINTMENT (OUTPATIENT)
Age: 88
End: 2023-05-23

## 2023-05-23 ENCOUNTER — APPOINTMENT (OUTPATIENT)
Dept: CARDIOLOGY | Facility: CLINIC | Age: 88
End: 2023-05-23
Payer: MEDICARE

## 2023-05-23 VITALS
DIASTOLIC BLOOD PRESSURE: 65 MMHG | SYSTOLIC BLOOD PRESSURE: 156 MMHG | BODY MASS INDEX: 25.69 KG/M2 | WEIGHT: 145 LBS | HEIGHT: 63 IN | OXYGEN SATURATION: 97 % | HEART RATE: 65 BPM

## 2023-05-23 DIAGNOSIS — R03.0 ELEVATED BLOOD-PRESSURE READING, W/OUT DIAGNOSIS OF HYPERTENSION: ICD-10-CM

## 2023-05-23 PROCEDURE — 99215 OFFICE O/P EST HI 40 MIN: CPT

## 2023-05-23 PROCEDURE — 93000 ELECTROCARDIOGRAM COMPLETE: CPT

## 2023-05-23 RX ORDER — DORZOLAMIDE HYDROCHLORIDE 20 MG/ML
2 SOLUTION OPHTHALMIC TWICE DAILY
Refills: 0 | Status: ACTIVE | COMMUNITY
Start: 2020-06-05

## 2023-05-29 ENCOUNTER — LABORATORY RESULT (OUTPATIENT)
Age: 88
End: 2023-05-29

## 2023-05-30 ENCOUNTER — NON-APPOINTMENT (OUTPATIENT)
Age: 88
End: 2023-05-30

## 2023-06-02 ENCOUNTER — APPOINTMENT (OUTPATIENT)
Dept: CARDIOLOGY | Facility: CLINIC | Age: 88
End: 2023-06-02
Payer: MEDICARE

## 2023-06-02 PROCEDURE — 93790 AMBL BP MNTR W/SW I&R: CPT

## 2023-06-13 ENCOUNTER — LABORATORY RESULT (OUTPATIENT)
Age: 88
End: 2023-06-13

## 2023-06-13 ENCOUNTER — NON-APPOINTMENT (OUTPATIENT)
Age: 88
End: 2023-06-13

## 2023-06-20 ENCOUNTER — LABORATORY RESULT (OUTPATIENT)
Age: 88
End: 2023-06-20

## 2023-06-21 ENCOUNTER — NON-APPOINTMENT (OUTPATIENT)
Age: 88
End: 2023-06-21

## 2023-07-05 ENCOUNTER — NON-APPOINTMENT (OUTPATIENT)
Age: 88
End: 2023-07-05

## 2023-07-05 ENCOUNTER — LABORATORY RESULT (OUTPATIENT)
Age: 88
End: 2023-07-05

## 2023-08-01 ENCOUNTER — LABORATORY RESULT (OUTPATIENT)
Age: 88
End: 2023-08-01

## 2023-08-01 ENCOUNTER — NON-APPOINTMENT (OUTPATIENT)
Age: 88
End: 2023-08-01

## 2023-08-16 ENCOUNTER — APPOINTMENT (OUTPATIENT)
Dept: INTERNAL MEDICINE | Facility: CLINIC | Age: 88
End: 2023-08-16
Payer: MEDICARE

## 2023-08-16 VITALS
DIASTOLIC BLOOD PRESSURE: 80 MMHG | OXYGEN SATURATION: 96 % | WEIGHT: 141 LBS | HEIGHT: 63 IN | SYSTOLIC BLOOD PRESSURE: 130 MMHG | BODY MASS INDEX: 24.98 KG/M2 | HEART RATE: 55 BPM

## 2023-08-16 DIAGNOSIS — R73.01 IMPAIRED FASTING GLUCOSE: ICD-10-CM

## 2023-08-16 DIAGNOSIS — E03.9 HYPOTHYROIDISM, UNSPECIFIED: ICD-10-CM

## 2023-08-16 DIAGNOSIS — D69.6 THROMBOCYTOPENIA, UNSPECIFIED: ICD-10-CM

## 2023-08-16 PROCEDURE — 36415 COLL VENOUS BLD VENIPUNCTURE: CPT

## 2023-08-16 PROCEDURE — 99214 OFFICE O/P EST MOD 30 MIN: CPT | Mod: 25

## 2023-08-16 NOTE — PLAN
[FreeTextEntry1] : Hypothyroidism Check thyroid function tests.  Continue levothyroxine 100 mcg daily.  Adjust dose if needed.  Atrial fibrillation She is stable on metoprolol and digoxin Continue Coumadin per cardiology.  She an appointment scheduled with cardiology soon.  Will check a lipid panel so it is available at the time of her appointment.  Health maintenance Depression screening negative  immunizations UTD f/u 3 months

## 2023-08-16 NOTE — HISTORY OF PRESENT ILLNESS
[FreeTextEntry1] : f/u lipids, thyroid, blood sugar [de-identified] : 89-year-old female with history of atrial fibrillation on Coumadin and hypothyroidism who is here for follow-up.    no palpitations, jitteriness, increased fatigue, change in weight or BM. She is on coumadin 2mg. no bleeding No chest pain, palpitations or shortness of breath She line dances and walks but is slowing down some and is doing less of it.

## 2023-08-16 NOTE — HEALTH RISK ASSESSMENT
[0] : 2) Feeling down, depressed, or hopeless: Not at all (0) [PHQ-2 Negative - No further assessment needed] : PHQ-2 Negative - No further assessment needed [[id number]] : 0 [Former] : Former [> 15 Years] : > 15 Years

## 2023-08-17 RX ORDER — LEVOTHYROXINE SODIUM 0.1 MG/1
100 TABLET ORAL
Qty: 90 | Refills: 0 | Status: ACTIVE | COMMUNITY
Start: 2022-09-30 | End: 1900-01-01

## 2023-08-21 ENCOUNTER — APPOINTMENT (OUTPATIENT)
Dept: CARDIOLOGY | Facility: CLINIC | Age: 88
End: 2023-08-21
Payer: MEDICARE

## 2023-08-21 VITALS
DIASTOLIC BLOOD PRESSURE: 79 MMHG | OXYGEN SATURATION: 96 % | BODY MASS INDEX: 25.16 KG/M2 | HEART RATE: 72 BPM | HEIGHT: 63 IN | SYSTOLIC BLOOD PRESSURE: 149 MMHG | WEIGHT: 142 LBS

## 2023-08-21 PROCEDURE — 93000 ELECTROCARDIOGRAM COMPLETE: CPT

## 2023-08-21 PROCEDURE — 99215 OFFICE O/P EST HI 40 MIN: CPT

## 2023-08-22 LAB
ALBUMIN SERPL ELPH-MCNC: 4.7 G/DL
ALP BLD-CCNC: 75 U/L
ALT SERPL-CCNC: 10 U/L
ANION GAP SERPL CALC-SCNC: 13 MMOL/L
AST SERPL-CCNC: 16 U/L
BILIRUB SERPL-MCNC: 0.9 MG/DL
BUN SERPL-MCNC: 14 MG/DL
CALCIUM SERPL-MCNC: 9.6 MG/DL
CHLORIDE SERPL-SCNC: 107 MMOL/L
CHOLEST SERPL-MCNC: 166 MG/DL
CO2 SERPL-SCNC: 22 MMOL/L
CREAT SERPL-MCNC: 0.62 MG/DL
EGFR: 84 ML/MIN/1.73M2
ESTIMATED AVERAGE GLUCOSE: 126 MG/DL
GLUCOSE SERPL-MCNC: 124 MG/DL
HBA1C MFR BLD HPLC: 6 %
HDLC SERPL-MCNC: 60 MG/DL
LDLC SERPL CALC-MCNC: 93 MG/DL
NONHDLC SERPL-MCNC: 106 MG/DL
POTASSIUM SERPL-SCNC: 4.3 MMOL/L
PROT SERPL-MCNC: 7 G/DL
SODIUM SERPL-SCNC: 143 MMOL/L
TRIGL SERPL-MCNC: 65 MG/DL
TSH SERPL-ACNC: 1.49 UIU/ML

## 2023-08-23 NOTE — DISCUSSION/SUMMARY
[EKG obtained to assist in diagnosis and management of assessed problem(s)] : EKG obtained to assist in diagnosis and management of assessed problem(s) [FreeTextEntry1] : Mrs. Combs has been doing well from a cardiac symptomatic standpoint since her previous visit here on 5/23/2023. Specifically, she does not describe having experienced any signs or symptoms to suggest the development of an anginal syndrome, congestive heart failure, or a hemodynamically-compromising arrhythmia. She is exhibiting persistent asymptomatic atrial fibrillation on her electrocardiogram today, with left axis deviation, nonspecific poor R wave progression in leads V1-V4, and nonspecific ST-T wave abnormalities, essentially unchanged from her previous office tracing, allowing for lead placement variation. Her cardiac examination today is unchanged from her previous visit with me, and she is specifically not exhibiting evidence of congestive heart failure on examination today. Her blood pressure reading today is satisfactory.  I have reviewed the findings of the 24-hour ambulatory blood pressure monitoring study of 6-23 in detail with the patient today.  I have reviewed the findings of the blood test report of 5/3/2023 in detail with the patient today.  I have recommended to the patient that she followup with her primary care provider regarding management of pre-diabetes, and hypothyroidism..  An INR measurement performed on 8/1/2023 revealed the reading to be 3.03.  The patient was instructed at that time to take Coumadin 2 mg 4 days/week and 3 mg 3 days/week, and to have a follow-up INR measurement performed in 4 weeks for reassessment.  I have instructed the patient to continue her cardiac medications as presently prescribed for the time being.  I have electronically prescribed a renewal for Toprol- mg daily, digoxin 0.125 mg daily, and warfarin 2 mg to the patient's pharmacy for her today.  I have reviewed the findings of the echocardiogram of 2/10/2021 and the carotid artery Doppler study of 2/10/2021 in detail with the patient today.  I am referring the patient for follow-up studies at this point for reassessment, noting that her retinal specialist advised her to have the studies updated.  I have asked the patient to call me if she should have any questions or problems pertaining to these matters, and especially if she should experience any concerning symptoms. I have otherwise asked her to return to the office for follow-up cardiac evaluation and blood pressure reassessment in 6 months, provided she remains clinically stable in the interim.

## 2023-08-23 NOTE — PHYSICAL EXAM
[General Appearance - Well Developed] : well developed [General Appearance - In No Acute Distress] : no acute distress [Normal Conjunctiva] : the conjunctiva exhibited no abnormalities [No Oral Pallor] : no oral pallor [Respiration, Rhythm And Depth] : normal respiratory rhythm and effort [Auscultation Breath Sounds / Voice Sounds] : lungs were clear to auscultation bilaterally [Bowel Sounds] : normal bowel sounds [Abdomen Tenderness] : non-tender [Abnormal Walk] : normal gait [Cyanosis, Localized] : no localized cyanosis [] : no rash [Oriented To Time, Place, And Person] : oriented to person, place, and time [Not Palpable] : not palpable [No Precordial Heave] : no precordial heave was noted [Normal Rate] : normal [Irregularly Irregular] : irregularly irregular [Normal S1] : normal S1 [Normal S2] : normal S2 [No Gallop] : no gallop heard [No Murmur] : no murmurs heard [2+] : left 2+ [No Abnormalities] : the abdominal aorta was not enlarged and no bruit was heard [No Pitting Edema] : no pitting edema present [FreeTextEntry1] : no significant JVD is appreciated at a 45 angle [Apical Thrill] : no thrill palpable at the apex [Click] : no click [Pericardial Rub] : no pericardial rub [Right Carotid Bruit] : no bruit heard over the right carotid [Left Carotid Bruit] : no bruit heard over the left carotid

## 2023-08-23 NOTE — HISTORY OF PRESENT ILLNESS
[FreeTextEntry1] : Mrs. Crista Combs presented to the office today for follow-up cardiac evaluation. I last evaluated the patient in the office on 5/23/2023.  The patient is a 89-year-old female with a history of permanent atrial fibrillation, being managed with rate-control therapy and anticoagulation. She has a history of mild aortic insufficiency, mild-moderate mitral regurgitation, mild-moderate tricuspid regurgitation, mild coronary arteriosclerosis, mild carotid atherosclerosis, pre-diabetes, hypothyroidism, anemia, thrombocytopenia, osteopenia, and glaucoma.  The patient has been doing well from a cardiac symptomatic standpoint since her previous visit here on 5/23/2023. Specifically, she has not noted recurrence of dyspnea on exertion, noting that she has been line dancing, without any associated cardiac symptoms. She has not experienced any symptoms of chest discomfort or palpitations. She has not noted orthopnea, paroxysmal nocturnal dyspnea, or lower extremity edema. She has not experienced any episodes of presyncope or syncope.  The patient had a follow-up evaluation with her ophthalmologist on 5/2/2023, at which time "a new hemorrhage" was detected involving the right optic nerve.  According to the patient, no specific further evaluation and/or treatment was recommended to her, other than a follow-up visit in 3 months.  Review of systems is significant for the patient having undergone left inguinal hernia repair surgery on 9/21/16, resection of a basal cell carcinoma from the right upper aspect of the back on 11/30/16, left cataract surgery on 4/9/18 and right cataract surgery on 4/26/18. She has been discovered as having pancreatic cysts, which are under surveillance.  In 2020, she was exhibiting an iron deficiency anemia, with a hemoglobin level of 8.6. She changed her diet and began taking a 9 supplement, and a follow-up hemoglobin level performed on 12/12/20 was normal at 13.7.  An INR measurement performed on 8/1/2023 (home drawing) revealed the level to be 3.03.  The patient was instructed at that time to take Coumadin 2 mg 4 days/week and 3 mg 3 days/week, and to have a follow-up INR measurement performed in 4 weeks for reassessment.  Laboratory studies performed on 5/3/2023 revealed cholesterol 171, triglycerides 61, HDL 64, and calculated LDL 95.  The liver chemistries were normal.  The BUN and creatinine were 18 and 0.6, respectively.  The potassium level was 4.4.  The glucose level was 109 and the hemoglobin A1c level was 5.8%.  The hemoglobin and hematocrit were 12.8 and 40.9, respectively.  The platelet count was 120,000.  The TSH level was 4.70.  A 24-hour ambulatory blood pressure monitoring study performed on 6/2/2023 was limited to 16 daytime readings and 8 nighttime readings.  The average reading was 115/63, with 4% of systolic readings being in the elevated range and 4% of diastolic readings being in the elevated range.  Echocardiography most recently performed on 2/10/2021 revealed the left atrium to be moderately enlarged in the right atrium to be mildly enlarged.  The right ventricle was enlarged with normal right ventricular systolic function.  The left ventricle was normal in diameter with normal left ventricular wall thickness and wall motion.  Left ventricular systolic function was normal, with an estimated ejection fraction of 60 to 65%.  Minimal aortic insufficiency was demonstrated.  Mild mitral regurgitation and mild tricuspid regurgitation were demonstrated, with an estimated right ventricular systolic pressure of 34 mmHg.  Carotid artery Doppler testing most recently performed on 2/10/2021 revealed mild plaque formation involving the bulbar regions and the proximal portions of the internal carotid arteries bilaterally.  Nuclear exercise testing performed on 12/7/15 for further evaluation of the patient's symptom of dyspnea on exertion  revealed the patient to exhibit diminished exercise capacity secondary to developing fatigue. No symptoms of chest discomfort or dyspnea developed during the study. There was an exaggerated heart rate response to exercise, noting the baseline rhythm of atrial fibrillation. Electrocardiographic evidence for myocardial ischemia was not demonstrated, noting the presence of baseline repolarization abnormalities, as well as the patient being treated with digoxin. Numerous episodes of wide-complex tachycardia were exhibited during exercise and the recovery period, possibly representing non-sustained ventricular tachycardia vs. aberrant conduction.The cardiac imaging portion of the study revealed normal left ventricular myocardial perfusion and systolic function, with a calculated ejection fraction of 64%  Coronary angiography performed on 12/21/15, for further of the patient's symptom of dyspnea on exertion and the wide-complex tachycardia episodes exhibited during the nuclear stress study, revealed mild luminal irregularities in the coronary arteries, however, no obstructive coronary artery disease was demonstrated.  Holter monitoring performed from 2/25/14 through 2/26/14 revealed atrial fibrillation throughout the recording, with an average ventricular rate of 72 beats per minute area there were no sustained extremes of bradycardic or tachycardic ventricular response. Rare to occasional pauses were noted, the longest of which was measured to be 3.3 seconds in duration. Rare ventricular premature contractions were noted, including 2 couplets, however, no episodes of ventricular tachycardia occurred. No symptoms were reported during the recording.  Previous History:  At the time of a previous visit here on 11/23/14, the patient informed me that over the preceding few months, she had noted an increased degree of dyspnea on exertion in association with her activities, which include walking and line dancing.  Nuclear exercise testing performed on 12/7/15 for further evaluation of the patient's symptom of dyspnea on exertion  revealed the patient to exhibit diminished exercise capacity secondary to developing fatigue. No symptoms of chest discomfort or dyspnea developed during the study. There was an exaggerated heart rate response to exercise, noting the baseline rhythm of atrial fibrillation. Electrocardiographic evidence for myocardial ischemia was not demonstrated, noting the presence of baseline repolarization abnormalities, as well as the patient being treated with digoxin. Numerous episodes of wide-complex tachycardia were exhibited during exercise and the recovery period, possibly representing non-sustained ventricular tachycardia vs. aberrant conduction.The cardiac imaging portion of the study revealed normal left ventricular myocardial perfusion and systolic function, with a calculated ejection fraction of 64%  Coronary angiography performed on 12/21/15, for further of the patient's symptom of dyspnea on exertion and the wide-complex tachycardia episodes exhibited during the nuclear stress study, revealed mild luminal irregularities in the coronary arteries, however, no obstructive coronary artery disease was demonstrated.  Blood testing performed prior to the cardiac catheterization demonstrated the presence of an anemia, for which the patient was referred back to her internist for further evaluation. She had follow-up blood testing performed through the office of Dr. Perez on 1/26/16, which revealed the hemoglobin and hematocrit to be 9.7 and 34.4, respectively, with microcytic, hypochromic indices. The iron profile revealed the serum iron to be diminished at 21, the total iron binding capacity to be elevated at 440, the iron percent saturation to be diminished at 5%, and the ferritin level to be diminished at 9.0, all compatible with iron deficiency. The patient was started on ferrous sulfate 325 mg once daily. In addition, her Synthroid dosage was reduced from 112 mcg daily to 100 mg daily, noting that the thyroid-stimulating hormone level was 0.19. The glucose level was 104 and the hemoglobin A1c level was 6.8%. The digoxin level was 0.4. The BUN and creatinine were 19 and 0.75, respectively, with an estimated glomerular filtration rate of 73. The potassium level was 4.7.The liver chemistries were normal.  As previously stated, in February of 2014, the patient developed gastroenteritis, manifesting as diarrhea, weakness, decreased appetite, lightheadedness, and shortness of breath.  After a few days of experiencing these symptoms, she presented to the emergency room at [facility] on 2/6/14, and was discovered as exhibiting atrial fibrillation with a tachycardic ventricular response. Regarding the atrial fibrillation, she was treated with intensified rate-control therapy (her dosage of Toprol-XL was increased and she was started on digoxin), and anticoagulation was initiated to reduce the risk of stroke associated with atrial fibrillation.  Cardioversion was not attempted, noting that the duration of the atrial fibrillation was uncertain, and the patient was not anticoagulated prior to presenting to the hospital. She was managed with intravenous hydration and antibiotic therapy for her gastroenteritis (she recently completed a course of Cipro and Flagyl). She reports having had a CT scan of the abdomen performed, however, neither she nor her daughter (who is a nurse in [place], and had accompanied the patient to her previous visit here on 2/19/14) were uncertain as to whether or not this study revealed evidence for diverticulitis.  As far as risk factors for coronary artery disease are concerned, the patient does not have a history of hypertension or a dyslipidemia.  She has pre-diabetes.  She denies a history of cigarette smoking. She does not have a known immediate family history of premature coronary artery disease.  Past medical history is otherwise significant for possible pericarditis more than 30 years ago, glaucoma, hypothyroidism, osteopenia (or osteoporosis), right inguinal hernia repair (with mesh) on 11/3/14, and left inguinal hernia repair on 9/21/16.

## 2023-08-23 NOTE — CARDIOLOGY SUMMARY
[de-identified] : 8/21/23 -atrial fibrillation with an average ventricular rate of 75 bpm.  Left axis deviation.  Nonspecific poor R wave progression in leads V1-V4.  Nonspecific ST-T wave abnormalities.

## 2023-08-29 ENCOUNTER — LABORATORY RESULT (OUTPATIENT)
Age: 88
End: 2023-08-29

## 2023-08-29 ENCOUNTER — NON-APPOINTMENT (OUTPATIENT)
Age: 88
End: 2023-08-29

## 2023-08-31 ENCOUNTER — APPOINTMENT (OUTPATIENT)
Dept: CARDIOLOGY | Facility: CLINIC | Age: 88
End: 2023-08-31
Payer: MEDICARE

## 2023-08-31 PROCEDURE — 93306 TTE W/DOPPLER COMPLETE: CPT

## 2023-08-31 PROCEDURE — 93880 EXTRACRANIAL BILAT STUDY: CPT

## 2023-09-26 ENCOUNTER — LABORATORY RESULT (OUTPATIENT)
Age: 88
End: 2023-09-26

## 2023-09-27 ENCOUNTER — NON-APPOINTMENT (OUTPATIENT)
Age: 88
End: 2023-09-27

## 2023-10-03 ENCOUNTER — NON-APPOINTMENT (OUTPATIENT)
Age: 88
End: 2023-10-03

## 2023-10-03 ENCOUNTER — LABORATORY RESULT (OUTPATIENT)
Age: 88
End: 2023-10-03

## 2023-10-17 ENCOUNTER — NON-APPOINTMENT (OUTPATIENT)
Age: 88
End: 2023-10-17

## 2023-10-17 ENCOUNTER — LABORATORY RESULT (OUTPATIENT)
Age: 88
End: 2023-10-17

## 2023-10-26 ENCOUNTER — LABORATORY RESULT (OUTPATIENT)
Age: 88
End: 2023-10-26

## 2023-10-26 ENCOUNTER — NON-APPOINTMENT (OUTPATIENT)
Age: 88
End: 2023-10-26

## 2023-11-01 ENCOUNTER — NON-APPOINTMENT (OUTPATIENT)
Age: 88
End: 2023-11-01

## 2023-11-02 ENCOUNTER — LABORATORY RESULT (OUTPATIENT)
Age: 88
End: 2023-11-02

## 2023-11-16 ENCOUNTER — LABORATORY RESULT (OUTPATIENT)
Age: 88
End: 2023-11-16

## 2023-11-17 ENCOUNTER — NON-APPOINTMENT (OUTPATIENT)
Age: 88
End: 2023-11-17

## 2023-11-27 ENCOUNTER — NON-APPOINTMENT (OUTPATIENT)
Age: 88
End: 2023-11-27

## 2023-11-27 ENCOUNTER — LABORATORY RESULT (OUTPATIENT)
Age: 88
End: 2023-11-27

## 2023-12-06 ENCOUNTER — NON-APPOINTMENT (OUTPATIENT)
Age: 88
End: 2023-12-06

## 2023-12-06 ENCOUNTER — LABORATORY RESULT (OUTPATIENT)
Age: 88
End: 2023-12-06

## 2023-12-13 ENCOUNTER — NON-APPOINTMENT (OUTPATIENT)
Age: 88
End: 2023-12-13

## 2023-12-13 ENCOUNTER — LABORATORY RESULT (OUTPATIENT)
Age: 88
End: 2023-12-13

## 2024-01-04 ENCOUNTER — LABORATORY RESULT (OUTPATIENT)
Age: 89
End: 2024-01-04

## 2024-01-04 ENCOUNTER — NON-APPOINTMENT (OUTPATIENT)
Age: 89
End: 2024-01-04

## 2024-01-25 ENCOUNTER — LABORATORY RESULT (OUTPATIENT)
Age: 89
End: 2024-01-25

## 2024-01-26 ENCOUNTER — NON-APPOINTMENT (OUTPATIENT)
Age: 89
End: 2024-01-26

## 2024-02-15 ENCOUNTER — NON-APPOINTMENT (OUTPATIENT)
Age: 89
End: 2024-02-15

## 2024-02-15 ENCOUNTER — LABORATORY RESULT (OUTPATIENT)
Age: 89
End: 2024-02-15

## 2024-02-22 ENCOUNTER — LABORATORY RESULT (OUTPATIENT)
Age: 89
End: 2024-02-22

## 2024-02-22 ENCOUNTER — NON-APPOINTMENT (OUTPATIENT)
Age: 89
End: 2024-02-22

## 2024-02-28 ENCOUNTER — NON-APPOINTMENT (OUTPATIENT)
Age: 89
End: 2024-02-28

## 2024-02-29 ENCOUNTER — LABORATORY RESULT (OUTPATIENT)
Age: 89
End: 2024-02-29

## 2024-03-01 ENCOUNTER — NON-APPOINTMENT (OUTPATIENT)
Age: 89
End: 2024-03-01

## 2024-03-05 ENCOUNTER — APPOINTMENT (OUTPATIENT)
Dept: CARDIOLOGY | Facility: CLINIC | Age: 89
End: 2024-03-05
Payer: MEDICARE

## 2024-03-05 VITALS
BODY MASS INDEX: 26.22 KG/M2 | WEIGHT: 148 LBS | HEIGHT: 63 IN | DIASTOLIC BLOOD PRESSURE: 65 MMHG | SYSTOLIC BLOOD PRESSURE: 157 MMHG | OXYGEN SATURATION: 91 % | HEART RATE: 72 BPM

## 2024-03-05 DIAGNOSIS — I35.1 NONRHEUMATIC AORTIC (VALVE) INSUFFICIENCY: ICD-10-CM

## 2024-03-05 DIAGNOSIS — E55.9 VITAMIN D DEFICIENCY, UNSPECIFIED: ICD-10-CM

## 2024-03-05 DIAGNOSIS — I65.29 OCCLUSION AND STENOSIS OF UNSPECIFIED CAROTID ARTERY: ICD-10-CM

## 2024-03-05 DIAGNOSIS — I48.91 UNSPECIFIED ATRIAL FIBRILLATION: ICD-10-CM

## 2024-03-05 DIAGNOSIS — I34.0 NONRHEUMATIC MITRAL (VALVE) INSUFFICIENCY: ICD-10-CM

## 2024-03-05 PROCEDURE — 99215 OFFICE O/P EST HI 40 MIN: CPT

## 2024-03-05 PROCEDURE — G2211 COMPLEX E/M VISIT ADD ON: CPT

## 2024-03-05 PROCEDURE — 93000 ELECTROCARDIOGRAM COMPLETE: CPT

## 2024-03-05 RX ORDER — DIGOXIN 125 UG/1
125 TABLET ORAL DAILY
Qty: 90 | Refills: 3 | Status: ACTIVE | COMMUNITY
Start: 2019-12-20 | End: 1900-01-01

## 2024-03-05 NOTE — PHYSICAL EXAM
[General Appearance - Well Developed] : well developed [General Appearance - In No Acute Distress] : no acute distress [Normal Conjunctiva] : the conjunctiva exhibited no abnormalities [No Oral Pallor] : no oral pallor [Respiration, Rhythm And Depth] : normal respiratory rhythm and effort [Auscultation Breath Sounds / Voice Sounds] : lungs were clear to auscultation bilaterally [Abdomen Tenderness] : non-tender [Bowel Sounds] : normal bowel sounds [Abnormal Walk] : normal gait [Cyanosis, Localized] : no localized cyanosis [Oriented To Time, Place, And Person] : oriented to person, place, and time [] : no rash [Not Palpable] : not palpable [No Precordial Heave] : no precordial heave was noted [Irregularly Irregular] : irregularly irregular [Normal Rate] : normal [Normal S1] : normal S1 [Normal S2] : normal S2 [No Gallop] : no gallop heard [No Murmur] : no murmurs heard [No Abnormalities] : the abdominal aorta was not enlarged and no bruit was heard [2+] : left 2+ [No Pitting Edema] : no pitting edema present [FreeTextEntry1] : no significant JVD is appreciated at a 45 angle [Apical Thrill] : no thrill palpable at the apex [Click] : no click [Pericardial Rub] : no pericardial rub [Right Carotid Bruit] : no bruit heard over the right carotid [Left Carotid Bruit] : no bruit heard over the left carotid

## 2024-03-05 NOTE — CARDIOLOGY SUMMARY
[de-identified] : 3/5/24 -atrial fibrillation with an average ventricular response of 55 to 60 bpm.  Left anterior hemiblock.  Nonspecific poor R wave progression in leads V1-V4.  Nonspecific diminished voltage in the limb leads and in the precordial leads.  Nonspecific ST-T wave abnormalities.

## 2024-03-05 NOTE — HISTORY OF PRESENT ILLNESS
[FreeTextEntry1] : Mrs. Crista Combs presented to the office today for follow-up cardiac evaluation. I last evaluated the patient in the office on 8/21/2023.  The patient is a 89-year-old female with a history of permanent atrial fibrillation, being managed with rate-control therapy and anticoagulation. She has a history of mild aortic insufficiency, mild-moderate mitral regurgitation, mild-moderate tricuspid regurgitation, mild coronary arteriosclerosis, mild carotid atherosclerosis, pre-diabetes, hypothyroidism, anemia, thrombocytopenia, osteopenia, and glaucoma.  The patient has been doing well from a cardiac symptomatic standpoint since her previous visit here on 8/21/2023. Specifically, she has not noted recurrence of dyspnea on exertion, noting that she has been line dancing, without any associated cardiac symptoms. She has not experienced any symptoms of chest discomfort or palpitations. She has not noted orthopnea, paroxysmal nocturnal dyspnea, or lower extremity edema. She has not experienced any episodes of presyncope or syncope.  The patient had a follow-up evaluation with her ophthalmologist on 5/2/2023, at which time "a new hemorrhage" was detected involving the right optic nerve.  According to the patient, no specific further evaluation and/or treatment was recommended to her, other than a follow-up visit in 3 months.  Review of systems is significant for the patient having undergone left inguinal hernia repair surgery on 9/21/16, resection of a basal cell carcinoma from the right upper aspect of the back on 11/30/16, left cataract surgery on 4/9/18 and right cataract surgery on 4/26/18. She has been discovered as having pancreatic cysts, which are under surveillance.  In 2020, she was exhibiting an iron deficiency anemia, with a hemoglobin level of 8.6. She changed her diet and began taking an iron supplement, and a follow-up hemoglobin level performed on 12/12/20 was normal at 13.7.  An INR measurement performed on 2/29/2024 (home drawing) revealed the level to be 1.29.  The patient was instructed at that time to take Coumadin 2 mg 5 days/week and 4 mg 2 days/week, and to have a follow-up INR measurement performed in 1 week for reassessment.  Laboratory studies performed on 12/11/2023 revealed cholesterol 164, triglycerides 120, HDL 57, and calculated LDL 86.  The liver chemistries were normal.  The BUN and creatinine were 19 and 0.7, respectively.  The potassium level was 4.8.  The glucose level was 140 and the hemoglobin A1c level was 5.9%.  The hemoglobin and hematocrit were 10.4 and 35.4, respectively (the patient subsequently restarted her iron supplement).  The TSH level was 5.44 (the patient had inadvertently discontinued levothyroxine 100 mcg daily prior to this blood test).  The vitamin D level was 20.7 (vitamin D3 1000 units was subsequently initiated).  A 24-hour ambulatory blood pressure monitoring study performed on 6/2/2023 was limited to 16 daytime readings and 8 nighttime readings.  The average reading was 115/63, with 4% of systolic readings being in the elevated range and 4% of diastolic readings being in the elevated range.  Echocardiography most recently performed on 8/31/2023 revealed the left atrium to be markedly dilated and the right atrium to be moderately dilated.  The ventricles were normal in dimension.  Left ventricular wall thickness was normal.  Left ventricular systolic function was normal, with an estimated ejection fraction of 55 to 60%.  Mild aortic regurgitation, mild mitral regurgitation, and mild to moderate tricuspid regurgitation were demonstrated, with an estimated PASP of 38 mmHg.  Carotid artery Doppler testing most recently performed on 8/31/2023 revealed mild plaque formation involving the bulbar regions bilaterally and the proximal portions of the internal carotid arteries bilaterally.  Nuclear exercise testing performed on 12/7/15 for further evaluation of the patient's symptom of dyspnea on exertion  revealed the patient to exhibit diminished exercise capacity secondary to developing fatigue. No symptoms of chest discomfort or dyspnea developed during the study. There was an exaggerated heart rate response to exercise, noting the baseline rhythm of atrial fibrillation. Electrocardiographic evidence for myocardial ischemia was not demonstrated, noting the presence of baseline repolarization abnormalities, as well as the patient being treated with digoxin. Numerous episodes of wide-complex tachycardia were exhibited during exercise and the recovery period, possibly representing non-sustained ventricular tachycardia vs. aberrant conduction.The cardiac imaging portion of the study revealed normal left ventricular myocardial perfusion and systolic function, with a calculated ejection fraction of 64%  Coronary angiography performed on 12/21/15, for further of the patient's symptom of dyspnea on exertion and the wide-complex tachycardia episodes exhibited during the nuclear stress study, revealed mild luminal irregularities in the coronary arteries, however, no obstructive coronary artery disease was demonstrated.  Holter monitoring performed from 2/25/14 through 2/26/14 revealed atrial fibrillation throughout the recording, with an average ventricular rate of 72 beats per minute area there were no sustained extremes of bradycardic or tachycardic ventricular response. Rare to occasional pauses were noted, the longest of which was measured to be 3.3 seconds in duration. Rare ventricular premature contractions were noted, including 2 couplets, however, no episodes of ventricular tachycardia occurred. No symptoms were reported during the recording.  Previous History:  At the time of a previous visit here on 11/23/14, the patient informed me that over the preceding few months, she had noted an increased degree of dyspnea on exertion in association with her activities, which include walking and line dancing.  Nuclear exercise testing performed on 12/7/15 for further evaluation of the patient's symptom of dyspnea on exertion  revealed the patient to exhibit diminished exercise capacity secondary to developing fatigue. No symptoms of chest discomfort or dyspnea developed during the study. There was an exaggerated heart rate response to exercise, noting the baseline rhythm of atrial fibrillation. Electrocardiographic evidence for myocardial ischemia was not demonstrated, noting the presence of baseline repolarization abnormalities, as well as the patient being treated with digoxin. Numerous episodes of wide-complex tachycardia were exhibited during exercise and the recovery period, possibly representing non-sustained ventricular tachycardia vs. aberrant conduction.The cardiac imaging portion of the study revealed normal left ventricular myocardial perfusion and systolic function, with a calculated ejection fraction of 64%  Coronary angiography performed on 12/21/15, for further of the patient's symptom of dyspnea on exertion and the wide-complex tachycardia episodes exhibited during the nuclear stress study, revealed mild luminal irregularities in the coronary arteries, however, no obstructive coronary artery disease was demonstrated.  Blood testing performed prior to the cardiac catheterization demonstrated the presence of an anemia, for which the patient was referred back to her internist for further evaluation. She had follow-up blood testing performed through the office of Dr. Perez on 1/26/16, which revealed the hemoglobin and hematocrit to be 9.7 and 34.4, respectively, with microcytic, hypochromic indices. The iron profile revealed the serum iron to be diminished at 21, the total iron binding capacity to be elevated at 440, the iron percent saturation to be diminished at 5%, and the ferritin level to be diminished at 9.0, all compatible with iron deficiency. The patient was started on ferrous sulfate 325 mg once daily. In addition, her Synthroid dosage was reduced from 112 mcg daily to 100 mg daily, noting that the thyroid-stimulating hormone level was 0.19. The glucose level was 104 and the hemoglobin A1c level was 6.8%. The digoxin level was 0.4. The BUN and creatinine were 19 and 0.75, respectively, with an estimated glomerular filtration rate of 73. The potassium level was 4.7.The liver chemistries were normal.  As previously stated, in February of 2014, the patient developed gastroenteritis, manifesting as diarrhea, weakness, decreased appetite, lightheadedness, and shortness of breath.  After a few days of experiencing these symptoms, she presented to the emergency room at [facility] on 2/6/14, and was discovered as exhibiting atrial fibrillation with a tachycardic ventricular response. Regarding the atrial fibrillation, she was treated with intensified rate-control therapy (her dosage of Toprol-XL was increased and she was started on digoxin), and anticoagulation was initiated to reduce the risk of stroke associated with atrial fibrillation.  Cardioversion was not attempted, noting that the duration of the atrial fibrillation was uncertain, and the patient was not anticoagulated prior to presenting to the hospital. She was managed with intravenous hydration and antibiotic therapy for her gastroenteritis (she recently completed a course of Cipro and Flagyl). She reports having had a CT scan of the abdomen performed, however, neither she nor her daughter (who is a nurse in [place], and had accompanied the patient to her previous visit here on 2/19/14) were uncertain as to whether or not this study revealed evidence for diverticulitis.  As far as risk factors for coronary artery disease are concerned, the patient does not have a history of hypertension or a dyslipidemia.  She has pre-diabetes.  She denies a history of cigarette smoking. She does not have a known immediate family history of premature coronary artery disease.  Past medical history is otherwise significant for possible pericarditis more than 30 years ago, glaucoma, hypothyroidism, osteopenia (or osteoporosis), right inguinal hernia repair (with mesh) on 11/3/14, and left inguinal hernia repair on 9/21/16.

## 2024-03-05 NOTE — DISCUSSION/SUMMARY
[FreeTextEntry1] : Mrs. Combs has been doing well from a cardiac symptomatic standpoint since her previous visit here on 8/21/2023. Specifically, she does not describe having experienced any signs or symptoms to suggest the development of an anginal syndrome, congestive heart failure, or a hemodynamically-compromising arrhythmia. She is exhibiting persistent asymptomatic atrial fibrillation on her electrocardiogram today, with nonspecific generalized diminished voltage, left axis deviation, nonspecific poor R wave progression in leads V1-V4, and nonspecific ST-T wave abnormalities, essentially unchanged from her previous office tracing, allowing for lead placement variation. Her cardiac examination today is unchanged from her previous visit with me, and she is specifically not exhibiting evidence of congestive heart failure on examination today. Her blood pressure reading today is mildly elevated.  I have reviewed the findings of the 24-hour ambulatory blood pressure monitoring study of 6-23 in detail with the patient today.  I have instructed the patient to continue her antihypertensive therapy as prescribed for the time being, and her blood pressure will be followed.  I have reviewed the findings of the blood test report of 12/11/2023 in detail with the patient today.  I have recommended to the patient that she followup with her primary care provider regarding management of pre-diabetes, vitamin D deficiency, and hypothyroidism.  An INR measurement performed on 2/29/2024 revealed the reading to be 1.29.  The patient was instructed at that time to take Coumadin 2 mg 5 days/week and 4 mg 2 days/week, and to have a follow-up INR measurement performed in 1 week for reassessment.  I have instructed the patient to continue her cardiac medications as presently prescribed for the time being.  I have electronically prescribed a renewal for Toprol- mg daily, digoxin 0.125 mg daily, and warfarin 2 mg to the patient's pharmacy for her today.  I have reviewed the findings of the echocardiogram of 8/31/2023 and the carotid artery Doppler study of 8/31/2023 in detail with the patient today.  I have asked the patient to call me if she should have any questions or problems pertaining to these matters, and especially if she should experience any concerning symptoms. I have otherwise asked her to return to the office for follow-up cardiac evaluation and blood pressure reassessment in 6 months, provided she remains clinically stable in the interim. [EKG obtained to assist in diagnosis and management of assessed problem(s)] : EKG obtained to assist in diagnosis and management of assessed problem(s)

## 2024-03-07 ENCOUNTER — NON-APPOINTMENT (OUTPATIENT)
Age: 89
End: 2024-03-07

## 2024-03-07 ENCOUNTER — LABORATORY RESULT (OUTPATIENT)
Age: 89
End: 2024-03-07

## 2024-03-11 ENCOUNTER — APPOINTMENT (OUTPATIENT)
Dept: CARDIOLOGY | Facility: CLINIC | Age: 89
End: 2024-03-11
Payer: MEDICARE

## 2024-03-11 LAB — INR PPP: 2.5

## 2024-03-11 PROCEDURE — 85610 PROTHROMBIN TIME: CPT | Mod: QW

## 2024-03-11 PROCEDURE — 93793 ANTICOAG MGMT PT WARFARIN: CPT

## 2024-03-21 ENCOUNTER — LABORATORY RESULT (OUTPATIENT)
Age: 89
End: 2024-03-21

## 2024-03-22 ENCOUNTER — NON-APPOINTMENT (OUTPATIENT)
Age: 89
End: 2024-03-22

## 2024-03-27 ENCOUNTER — NON-APPOINTMENT (OUTPATIENT)
Age: 89
End: 2024-03-27

## 2024-03-28 ENCOUNTER — LABORATORY RESULT (OUTPATIENT)
Age: 89
End: 2024-03-28

## 2024-03-28 ENCOUNTER — NON-APPOINTMENT (OUTPATIENT)
Age: 89
End: 2024-03-28

## 2024-04-11 ENCOUNTER — NON-APPOINTMENT (OUTPATIENT)
Age: 89
End: 2024-04-11

## 2024-04-11 ENCOUNTER — LABORATORY RESULT (OUTPATIENT)
Age: 89
End: 2024-04-11

## 2024-05-02 ENCOUNTER — LABORATORY RESULT (OUTPATIENT)
Age: 89
End: 2024-05-02

## 2024-05-03 ENCOUNTER — NON-APPOINTMENT (OUTPATIENT)
Age: 89
End: 2024-05-03

## 2024-05-08 ENCOUNTER — LABORATORY RESULT (OUTPATIENT)
Age: 89
End: 2024-05-08

## 2024-05-10 ENCOUNTER — NON-APPOINTMENT (OUTPATIENT)
Age: 89
End: 2024-05-10

## 2024-05-16 ENCOUNTER — LABORATORY RESULT (OUTPATIENT)
Age: 89
End: 2024-05-16

## 2024-05-16 ENCOUNTER — NON-APPOINTMENT (OUTPATIENT)
Age: 89
End: 2024-05-16

## 2024-05-23 ENCOUNTER — LABORATORY RESULT (OUTPATIENT)
Age: 89
End: 2024-05-23

## 2024-05-24 ENCOUNTER — NON-APPOINTMENT (OUTPATIENT)
Age: 89
End: 2024-05-24

## 2024-05-30 ENCOUNTER — NON-APPOINTMENT (OUTPATIENT)
Age: 89
End: 2024-05-30

## 2024-05-30 ENCOUNTER — LABORATORY RESULT (OUTPATIENT)
Age: 89
End: 2024-05-30

## 2024-06-06 ENCOUNTER — LABORATORY RESULT (OUTPATIENT)
Age: 89
End: 2024-06-06

## 2024-06-06 ENCOUNTER — NON-APPOINTMENT (OUTPATIENT)
Age: 89
End: 2024-06-06

## 2024-06-20 ENCOUNTER — LABORATORY RESULT (OUTPATIENT)
Age: 89
End: 2024-06-20

## 2024-06-21 ENCOUNTER — NON-APPOINTMENT (OUTPATIENT)
Age: 89
End: 2024-06-21

## 2024-06-28 ENCOUNTER — NON-APPOINTMENT (OUTPATIENT)
Age: 89
End: 2024-06-28

## 2024-06-28 ENCOUNTER — LABORATORY RESULT (OUTPATIENT)
Age: 89
End: 2024-06-28

## 2024-07-08 ENCOUNTER — LABORATORY RESULT (OUTPATIENT)
Age: 89
End: 2024-07-08

## 2024-07-09 ENCOUNTER — NON-APPOINTMENT (OUTPATIENT)
Age: 89
End: 2024-07-09

## 2024-07-15 ENCOUNTER — LABORATORY RESULT (OUTPATIENT)
Age: 89
End: 2024-07-15

## 2024-07-16 ENCOUNTER — NON-APPOINTMENT (OUTPATIENT)
Age: 89
End: 2024-07-16

## 2024-07-29 ENCOUNTER — NON-APPOINTMENT (OUTPATIENT)
Age: 89
End: 2024-07-29

## 2024-07-29 ENCOUNTER — LABORATORY RESULT (OUTPATIENT)
Age: 89
End: 2024-07-29

## 2024-08-05 ENCOUNTER — LABORATORY RESULT (OUTPATIENT)
Age: 89
End: 2024-08-05

## 2024-08-05 ENCOUNTER — NON-APPOINTMENT (OUTPATIENT)
Age: 89
End: 2024-08-05

## 2024-08-22 ENCOUNTER — LABORATORY RESULT (OUTPATIENT)
Age: 89
End: 2024-08-22

## 2024-08-22 ENCOUNTER — NON-APPOINTMENT (OUTPATIENT)
Age: 89
End: 2024-08-22

## 2024-09-12 ENCOUNTER — NON-APPOINTMENT (OUTPATIENT)
Age: 89
End: 2024-09-12

## 2024-09-12 ENCOUNTER — LABORATORY RESULT (OUTPATIENT)
Age: 89
End: 2024-09-12

## 2024-10-03 ENCOUNTER — LABORATORY RESULT (OUTPATIENT)
Age: 89
End: 2024-10-03

## 2024-10-04 ENCOUNTER — NON-APPOINTMENT (OUTPATIENT)
Age: 89
End: 2024-10-04

## 2024-10-14 ENCOUNTER — APPOINTMENT (OUTPATIENT)
Dept: CARDIOLOGY | Facility: CLINIC | Age: 89
End: 2024-10-14
Payer: MEDICARE

## 2024-10-14 VITALS
SYSTOLIC BLOOD PRESSURE: 133 MMHG | WEIGHT: 138 LBS | OXYGEN SATURATION: 99 % | HEART RATE: 63 BPM | BODY MASS INDEX: 24.45 KG/M2 | HEIGHT: 63 IN | DIASTOLIC BLOOD PRESSURE: 70 MMHG

## 2024-10-14 DIAGNOSIS — R94.31 ABNORMAL ELECTROCARDIOGRAM [ECG] [EKG]: ICD-10-CM

## 2024-10-14 DIAGNOSIS — I48.91 UNSPECIFIED ATRIAL FIBRILLATION: ICD-10-CM

## 2024-10-14 DIAGNOSIS — I35.1 NONRHEUMATIC AORTIC (VALVE) INSUFFICIENCY: ICD-10-CM

## 2024-10-14 DIAGNOSIS — I34.0 NONRHEUMATIC MITRAL (VALVE) INSUFFICIENCY: ICD-10-CM

## 2024-10-14 PROCEDURE — 93000 ELECTROCARDIOGRAM COMPLETE: CPT

## 2024-10-14 PROCEDURE — G2211 COMPLEX E/M VISIT ADD ON: CPT

## 2024-10-14 PROCEDURE — 99215 OFFICE O/P EST HI 40 MIN: CPT

## 2024-10-24 ENCOUNTER — NON-APPOINTMENT (OUTPATIENT)
Age: 89
End: 2024-10-24

## 2024-10-24 ENCOUNTER — LABORATORY RESULT (OUTPATIENT)
Age: 89
End: 2024-10-24

## 2024-11-07 ENCOUNTER — LABORATORY RESULT (OUTPATIENT)
Age: 89
End: 2024-11-07

## 2024-11-08 ENCOUNTER — NON-APPOINTMENT (OUTPATIENT)
Age: 89
End: 2024-11-08

## 2024-11-13 ENCOUNTER — LABORATORY RESULT (OUTPATIENT)
Age: 89
End: 2024-11-13

## 2024-11-20 ENCOUNTER — NON-APPOINTMENT (OUTPATIENT)
Age: 89
End: 2024-11-20

## 2024-11-20 ENCOUNTER — LABORATORY RESULT (OUTPATIENT)
Age: 89
End: 2024-11-20

## 2024-12-03 ENCOUNTER — LABORATORY RESULT (OUTPATIENT)
Age: 88
End: 2024-12-03

## 2024-12-04 ENCOUNTER — NON-APPOINTMENT (OUTPATIENT)
Age: 88
End: 2024-12-04

## 2024-12-11 ENCOUNTER — LABORATORY RESULT (OUTPATIENT)
Age: 88
End: 2024-12-11

## 2024-12-11 ENCOUNTER — NON-APPOINTMENT (OUTPATIENT)
Age: 88
End: 2024-12-11

## 2024-12-17 ENCOUNTER — LABORATORY RESULT (OUTPATIENT)
Age: 88
End: 2024-12-17

## 2024-12-18 ENCOUNTER — NON-APPOINTMENT (OUTPATIENT)
Age: 88
End: 2024-12-18

## 2024-12-26 ENCOUNTER — LABORATORY RESULT (OUTPATIENT)
Age: 88
End: 2024-12-26

## 2024-12-27 ENCOUNTER — NON-APPOINTMENT (OUTPATIENT)
Age: 88
End: 2024-12-27

## 2025-01-07 ENCOUNTER — LABORATORY RESULT (OUTPATIENT)
Age: 89
End: 2025-01-07

## 2025-01-07 ENCOUNTER — NON-APPOINTMENT (OUTPATIENT)
Age: 89
End: 2025-01-07

## 2025-01-28 ENCOUNTER — NON-APPOINTMENT (OUTPATIENT)
Age: 89
End: 2025-01-28

## 2025-01-28 ENCOUNTER — LABORATORY RESULT (OUTPATIENT)
Age: 89
End: 2025-01-28

## 2025-02-25 ENCOUNTER — LABORATORY RESULT (OUTPATIENT)
Age: 89
End: 2025-02-25

## 2025-03-04 ENCOUNTER — LABORATORY RESULT (OUTPATIENT)
Age: 89
End: 2025-03-04

## 2025-03-04 ENCOUNTER — NON-APPOINTMENT (OUTPATIENT)
Age: 89
End: 2025-03-04

## 2025-03-06 NOTE — REVIEW OF SYSTEMS
Detail Level: Detailed
Consent: The patient's verbal consent was obtained including but not limited to risks of crusting, scabbing, blistering, scarring, darker or lighter pigmentary change, recurrence, incomplete removal and infection.
Show Aperture Variable?: Yes
Render Note In Bullet Format When Appropriate: No
Post-Care Instructions: I reviewed with the patient in detail post-care instructions. Patient is to wear sunprotection, and avoid picking at any of the treated lesions. Pt may apply Vaseline to crusted or scabbing areas and cover as needed
Duration Of Freeze Thaw-Cycle (Seconds): 0
[Negative] : Heme/Lymph

## 2025-03-13 ENCOUNTER — LABORATORY RESULT (OUTPATIENT)
Age: 89
End: 2025-03-13

## 2025-03-27 ENCOUNTER — LABORATORY RESULT (OUTPATIENT)
Age: 89
End: 2025-03-27

## 2025-03-27 ENCOUNTER — NON-APPOINTMENT (OUTPATIENT)
Age: 89
End: 2025-03-27

## 2025-04-03 ENCOUNTER — NON-APPOINTMENT (OUTPATIENT)
Age: 89
End: 2025-04-03

## 2025-04-03 ENCOUNTER — LABORATORY RESULT (OUTPATIENT)
Age: 89
End: 2025-04-03

## 2025-04-15 ENCOUNTER — NON-APPOINTMENT (OUTPATIENT)
Age: 89
End: 2025-04-15

## 2025-04-15 ENCOUNTER — LABORATORY RESULT (OUTPATIENT)
Age: 89
End: 2025-04-15

## 2025-04-22 ENCOUNTER — LABORATORY RESULT (OUTPATIENT)
Age: 89
End: 2025-04-22

## 2025-04-22 ENCOUNTER — NON-APPOINTMENT (OUTPATIENT)
Age: 89
End: 2025-04-22

## 2025-04-22 DIAGNOSIS — Z12.31 ENCOUNTER FOR SCREENING MAMMOGRAM FOR MALIGNANT NEOPLASM OF BREAST: ICD-10-CM

## 2025-05-01 ENCOUNTER — NON-APPOINTMENT (OUTPATIENT)
Age: 89
End: 2025-05-01

## 2025-05-01 ENCOUNTER — LABORATORY RESULT (OUTPATIENT)
Age: 89
End: 2025-05-01

## 2025-05-02 ENCOUNTER — NON-APPOINTMENT (OUTPATIENT)
Age: 89
End: 2025-05-02

## 2025-05-08 ENCOUNTER — LABORATORY RESULT (OUTPATIENT)
Age: 89
End: 2025-05-08

## 2025-05-15 ENCOUNTER — NON-APPOINTMENT (OUTPATIENT)
Age: 89
End: 2025-05-15

## 2025-05-15 ENCOUNTER — LABORATORY RESULT (OUTPATIENT)
Age: 89
End: 2025-05-15

## 2025-05-29 ENCOUNTER — LABORATORY RESULT (OUTPATIENT)
Age: 89
End: 2025-05-29

## 2025-05-29 ENCOUNTER — NON-APPOINTMENT (OUTPATIENT)
Age: 89
End: 2025-05-29

## 2025-06-26 ENCOUNTER — LABORATORY RESULT (OUTPATIENT)
Age: 89
End: 2025-06-26

## 2025-06-27 ENCOUNTER — NON-APPOINTMENT (OUTPATIENT)
Age: 89
End: 2025-06-27

## 2025-07-14 ENCOUNTER — NON-APPOINTMENT (OUTPATIENT)
Age: 89
End: 2025-07-14

## 2025-07-14 ENCOUNTER — APPOINTMENT (OUTPATIENT)
Dept: CARDIOLOGY | Facility: CLINIC | Age: 89
End: 2025-07-14
Payer: MEDICARE

## 2025-07-14 VITALS
BODY MASS INDEX: 23.21 KG/M2 | WEIGHT: 131 LBS | DIASTOLIC BLOOD PRESSURE: 72 MMHG | HEIGHT: 63 IN | OXYGEN SATURATION: 96 % | SYSTOLIC BLOOD PRESSURE: 135 MMHG | HEART RATE: 64 BPM

## 2025-07-14 PROCEDURE — 99215 OFFICE O/P EST HI 40 MIN: CPT

## 2025-07-14 PROCEDURE — G2211 COMPLEX E/M VISIT ADD ON: CPT

## 2025-07-14 PROCEDURE — 93000 ELECTROCARDIOGRAM COMPLETE: CPT

## 2025-07-24 ENCOUNTER — LABORATORY RESULT (OUTPATIENT)
Age: 89
End: 2025-07-24

## 2025-07-25 ENCOUNTER — LABORATORY RESULT (OUTPATIENT)
Age: 89
End: 2025-07-25

## 2025-07-28 ENCOUNTER — LABORATORY RESULT (OUTPATIENT)
Age: 89
End: 2025-07-28

## 2025-07-28 ENCOUNTER — NON-APPOINTMENT (OUTPATIENT)
Age: 89
End: 2025-07-28

## 2025-08-28 ENCOUNTER — LABORATORY RESULT (OUTPATIENT)
Age: 89
End: 2025-08-28

## 2025-09-04 ENCOUNTER — LABORATORY RESULT (OUTPATIENT)
Age: 89
End: 2025-09-04

## 2025-09-05 ENCOUNTER — NON-APPOINTMENT (OUTPATIENT)
Age: 89
End: 2025-09-05

## 2025-09-11 ENCOUNTER — NON-APPOINTMENT (OUTPATIENT)
Age: 89
End: 2025-09-11

## 2025-09-11 ENCOUNTER — LABORATORY RESULT (OUTPATIENT)
Age: 89
End: 2025-09-11